# Patient Record
Sex: MALE | Race: WHITE | NOT HISPANIC OR LATINO
[De-identification: names, ages, dates, MRNs, and addresses within clinical notes are randomized per-mention and may not be internally consistent; named-entity substitution may affect disease eponyms.]

---

## 2018-04-12 PROBLEM — Z00.00 ENCOUNTER FOR PREVENTIVE HEALTH EXAMINATION: Status: ACTIVE | Noted: 2018-04-12

## 2018-04-16 ENCOUNTER — APPOINTMENT (OUTPATIENT)
Dept: ORTHOPEDIC SURGERY | Facility: CLINIC | Age: 33
End: 2018-04-16
Payer: MEDICAID

## 2018-04-16 VITALS — RESPIRATION RATE: 16 BRPM | WEIGHT: 180 LBS | BODY MASS INDEX: 23.86 KG/M2 | HEIGHT: 73 IN

## 2018-04-16 DIAGNOSIS — M21.42 FLAT FOOT [PES PLANUS] (ACQUIRED), RIGHT FOOT: ICD-10-CM

## 2018-04-16 DIAGNOSIS — F17.200 NICOTINE DEPENDENCE, UNSPECIFIED, UNCOMPLICATED: ICD-10-CM

## 2018-04-16 DIAGNOSIS — M21.41 FLAT FOOT [PES PLANUS] (ACQUIRED), RIGHT FOOT: ICD-10-CM

## 2018-04-16 DIAGNOSIS — Z87.442 PERSONAL HISTORY OF URINARY CALCULI: ICD-10-CM

## 2018-04-16 DIAGNOSIS — M24.573 CONTRACTURE, UNSPECIFIED ANKLE: ICD-10-CM

## 2018-04-16 PROCEDURE — 99203 OFFICE O/P NEW LOW 30 MIN: CPT

## 2018-04-16 RX ORDER — DEXTROAMPHETAMINE SACCHARATE, AMPHETAMINE ASPARTATE, DEXTROAMPHETAMINE SULFATE, AND AMPHETAMINE SULFATE 3.75; 3.75; 3.75; 3.75 MG/1; MG/1; MG/1; MG/1
TABLET ORAL
Refills: 0 | Status: ACTIVE | COMMUNITY

## 2018-04-16 RX ORDER — ALPRAZOLAM 2 MG/1
TABLET ORAL
Refills: 0 | Status: ACTIVE | COMMUNITY

## 2018-04-23 PROBLEM — M21.41 PES PLANUS OF BOTH FEET: Status: ACTIVE | Noted: 2018-04-12

## 2018-04-23 PROBLEM — F17.200 CURRENT EVERY DAY SMOKER: Status: ACTIVE | Noted: 2018-04-16

## 2018-04-23 PROBLEM — Z87.442 HISTORY OF KIDNEY STONES: Status: RESOLVED | Noted: 2018-04-16 | Resolved: 2018-04-23

## 2018-08-04 ENCOUNTER — EMERGENCY (EMERGENCY)
Facility: HOSPITAL | Age: 33
LOS: 1 days | Discharge: HOME | End: 2018-08-04
Admitting: EMERGENCY MEDICINE
Payer: COMMERCIAL

## 2018-08-04 VITALS
HEART RATE: 98 BPM | TEMPERATURE: 97 F | WEIGHT: 175.05 LBS | SYSTOLIC BLOOD PRESSURE: 130 MMHG | DIASTOLIC BLOOD PRESSURE: 75 MMHG | OXYGEN SATURATION: 100 % | RESPIRATION RATE: 18 BRPM

## 2018-08-04 LAB
ALBUMIN SERPL ELPH-MCNC: 4.4 G/DL — SIGNIFICANT CHANGE UP (ref 3.4–5)
ALP SERPL-CCNC: 86 U/L — SIGNIFICANT CHANGE UP (ref 40–120)
ALT FLD-CCNC: 23 U/L — SIGNIFICANT CHANGE UP (ref 12–42)
ANION GAP SERPL CALC-SCNC: 10 MMOL/L — SIGNIFICANT CHANGE UP (ref 9–16)
AST SERPL-CCNC: 17 U/L — SIGNIFICANT CHANGE UP (ref 15–37)
BASOPHILS NFR BLD AUTO: 0.3 % — SIGNIFICANT CHANGE UP (ref 0–2)
BILIRUB SERPL-MCNC: 0.6 MG/DL — SIGNIFICANT CHANGE UP (ref 0.2–1.2)
BUN SERPL-MCNC: 16 MG/DL — SIGNIFICANT CHANGE UP (ref 7–23)
CALCIUM SERPL-MCNC: 9.1 MG/DL — SIGNIFICANT CHANGE UP (ref 8.5–10.5)
CHLORIDE SERPL-SCNC: 108 MMOL/L — SIGNIFICANT CHANGE UP (ref 96–108)
CO2 SERPL-SCNC: 25 MMOL/L — SIGNIFICANT CHANGE UP (ref 22–31)
CREAT SERPL-MCNC: 0.83 MG/DL — SIGNIFICANT CHANGE UP (ref 0.5–1.3)
EOSINOPHIL NFR BLD AUTO: 0.1 % — SIGNIFICANT CHANGE UP (ref 0–6)
GLUCOSE SERPL-MCNC: 92 MG/DL — SIGNIFICANT CHANGE UP (ref 70–99)
HCT VFR BLD CALC: 46.1 % — SIGNIFICANT CHANGE UP (ref 39–50)
HGB BLD-MCNC: 16 G/DL — SIGNIFICANT CHANGE UP (ref 13–17)
IMM GRANULOCYTES NFR BLD AUTO: 0.3 % — SIGNIFICANT CHANGE UP (ref 0–1.5)
LIDOCAIN IGE QN: 164 U/L — SIGNIFICANT CHANGE UP (ref 73–393)
LYMPHOCYTES # BLD AUTO: 2.5 % — LOW (ref 13–44)
MAGNESIUM SERPL-MCNC: 2.1 MG/DL — SIGNIFICANT CHANGE UP (ref 1.6–2.6)
MCHC RBC-ENTMCNC: 32.7 PG — SIGNIFICANT CHANGE UP (ref 27–34)
MCHC RBC-ENTMCNC: 34.7 G/DL — SIGNIFICANT CHANGE UP (ref 32–36)
MCV RBC AUTO: 94.3 FL — SIGNIFICANT CHANGE UP (ref 80–100)
MONOCYTES NFR BLD AUTO: 5.7 % — SIGNIFICANT CHANGE UP (ref 2–14)
NEUTROPHILS NFR BLD AUTO: 91.1 % — HIGH (ref 43–77)
PLATELET # BLD AUTO: 267 K/UL — SIGNIFICANT CHANGE UP (ref 150–400)
POTASSIUM SERPL-MCNC: 4.1 MMOL/L — SIGNIFICANT CHANGE UP (ref 3.5–5.3)
POTASSIUM SERPL-SCNC: 4.1 MMOL/L — SIGNIFICANT CHANGE UP (ref 3.5–5.3)
PROT SERPL-MCNC: 7.8 G/DL — SIGNIFICANT CHANGE UP (ref 6.4–8.2)
RBC # BLD: 4.89 M/UL — SIGNIFICANT CHANGE UP (ref 4.2–5.8)
RBC # FLD: 12.5 % — SIGNIFICANT CHANGE UP (ref 10.3–16.9)
SODIUM SERPL-SCNC: 143 MMOL/L — SIGNIFICANT CHANGE UP (ref 132–145)
WBC # BLD: 15.7 K/UL — HIGH (ref 3.8–10.5)
WBC # FLD AUTO: 15.7 K/UL — HIGH (ref 3.8–10.5)

## 2018-08-04 PROCEDURE — 99284 EMERGENCY DEPT VISIT MOD MDM: CPT

## 2018-08-04 PROCEDURE — 76705 ECHO EXAM OF ABDOMEN: CPT | Mod: 26

## 2018-08-04 RX ORDER — ACETAMINOPHEN 500 MG
975 TABLET ORAL ONCE
Qty: 0 | Refills: 0 | Status: COMPLETED | OUTPATIENT
Start: 2018-08-04 | End: 2018-08-04

## 2018-08-04 RX ORDER — SODIUM CHLORIDE 9 MG/ML
2000 INJECTION INTRAMUSCULAR; INTRAVENOUS; SUBCUTANEOUS ONCE
Qty: 0 | Refills: 0 | Status: COMPLETED | OUTPATIENT
Start: 2018-08-04 | End: 2018-08-04

## 2018-08-04 RX ORDER — FAMOTIDINE 10 MG/ML
20 INJECTION INTRAVENOUS ONCE
Qty: 0 | Refills: 0 | Status: COMPLETED | OUTPATIENT
Start: 2018-08-04 | End: 2018-08-04

## 2018-08-04 RX ORDER — ONDANSETRON 8 MG/1
8 TABLET, FILM COATED ORAL ONCE
Qty: 0 | Refills: 0 | Status: COMPLETED | OUTPATIENT
Start: 2018-08-04 | End: 2018-08-04

## 2018-08-04 RX ADMIN — SODIUM CHLORIDE 2000 MILLILITER(S): 9 INJECTION INTRAMUSCULAR; INTRAVENOUS; SUBCUTANEOUS at 13:56

## 2018-08-04 RX ADMIN — FAMOTIDINE 20 MILLIGRAM(S): 10 INJECTION INTRAVENOUS at 13:56

## 2018-08-04 RX ADMIN — SODIUM CHLORIDE 2000 MILLILITER(S): 9 INJECTION INTRAMUSCULAR; INTRAVENOUS; SUBCUTANEOUS at 15:05

## 2018-08-04 RX ADMIN — ONDANSETRON 8 MILLIGRAM(S): 8 TABLET, FILM COATED ORAL at 13:56

## 2018-08-04 RX ADMIN — Medication 975 MILLIGRAM(S): at 14:48

## 2018-08-04 NOTE — ED ADULT NURSE NOTE - NSIMPLEMENTINTERV_GEN_ALL_ED
Implemented All Universal Safety Interventions:  [place] to call system. Call bell, personal items and telephone within reach. Instruct patient to call for assistance. Room bathroom lighting operational. Non-slip footwear when patient is off stretcher. Physically safe environment: no spills, clutter or unnecessary equipment. Stretcher in lowest position, wheels locked, appropriate side rails in place.

## 2018-08-04 NOTE — ED ADULT NURSE NOTE - CHPI ED NUR SYMPTOMS NEG
no dysuria/no fever/no chills/no diarrhea/no burning urination/no blood in stool/no hematuria/no abdominal distension

## 2018-08-04 NOTE — ED PROVIDER NOTE - OBJECTIVE STATEMENT
PMHx renal calculi s/p lithotripsy, stent one year ago presents with nausea, 3 episodes of vomiting and epigastric discomfort x 3 hours. admits to having 4-5 drinks last night. states that he woke up yesturday morning with same symptoms after having two glasses of wine. usually had 2-5 drinks on the weekend. denies history of similar sx. admits to reflux. last colonoscopy was one year ago.

## 2018-08-04 NOTE — ED PROVIDER NOTE - MEDICAL DECISION MAKING DETAILS
patient with nausea, vomiting, epigastric pain. will give zofran, IV hydration, check labs and do US

## 2018-08-04 NOTE — ED PROVIDER NOTE - PROGRESS NOTE DETAILS
patient feeling better after pepsid, zofran, IV hydration. all findings discussed. advised follow up with GI

## 2018-08-08 DIAGNOSIS — R10.13 EPIGASTRIC PAIN: ICD-10-CM

## 2018-08-08 DIAGNOSIS — K29.70 GASTRITIS, UNSPECIFIED, WITHOUT BLEEDING: ICD-10-CM

## 2018-08-08 DIAGNOSIS — Z79.2 LONG TERM (CURRENT) USE OF ANTIBIOTICS: ICD-10-CM

## 2018-08-08 DIAGNOSIS — Z79.899 OTHER LONG TERM (CURRENT) DRUG THERAPY: ICD-10-CM

## 2018-08-08 DIAGNOSIS — Z79.891 LONG TERM (CURRENT) USE OF OPIATE ANALGESIC: ICD-10-CM

## 2018-12-09 ENCOUNTER — FORM ENCOUNTER (OUTPATIENT)
Age: 33
End: 2018-12-09

## 2018-12-10 ENCOUNTER — APPOINTMENT (OUTPATIENT)
Dept: RADIOLOGY | Facility: CLINIC | Age: 33
End: 2018-12-10
Payer: COMMERCIAL

## 2018-12-10 ENCOUNTER — OUTPATIENT (OUTPATIENT)
Dept: OUTPATIENT SERVICES | Facility: HOSPITAL | Age: 33
LOS: 1 days | End: 2018-12-10

## 2018-12-10 ENCOUNTER — APPOINTMENT (OUTPATIENT)
Dept: ORTHOPEDIC SURGERY | Facility: CLINIC | Age: 33
End: 2018-12-10
Payer: COMMERCIAL

## 2018-12-10 VITALS — BODY MASS INDEX: 24.78 KG/M2 | WEIGHT: 187 LBS | HEIGHT: 73 IN

## 2018-12-10 DIAGNOSIS — Z87.19 PERSONAL HISTORY OF OTHER DISEASES OF THE DIGESTIVE SYSTEM: ICD-10-CM

## 2018-12-10 DIAGNOSIS — M75.51 BURSITIS OF RIGHT SHOULDER: ICD-10-CM

## 2018-12-10 DIAGNOSIS — M75.52 BURSITIS OF LEFT SHOULDER: ICD-10-CM

## 2018-12-10 PROCEDURE — 73030 X-RAY EXAM OF SHOULDER: CPT | Mod: 26,50

## 2018-12-10 PROCEDURE — 99214 OFFICE O/P EST MOD 30 MIN: CPT

## 2018-12-10 RX ORDER — OMEPRAZOLE 20 MG/1
20 TABLET, DELAYED RELEASE ORAL
Refills: 0 | Status: ACTIVE | COMMUNITY

## 2018-12-10 RX ORDER — EMTRICITABINE AND TENOFOVIR DISOPROXIL FUMARATE 167; 250 MG/1; MG/1
TABLET, FILM COATED ORAL
Refills: 0 | Status: ACTIVE | COMMUNITY

## 2020-01-08 ENCOUNTER — EMERGENCY (EMERGENCY)
Facility: HOSPITAL | Age: 35
LOS: 1 days | Discharge: HOME | End: 2020-01-08
Attending: EMERGENCY MEDICINE | Admitting: EMERGENCY MEDICINE
Payer: COMMERCIAL

## 2020-01-08 VITALS
DIASTOLIC BLOOD PRESSURE: 75 MMHG | OXYGEN SATURATION: 97 % | TEMPERATURE: 99 F | WEIGHT: 175.93 LBS | RESPIRATION RATE: 19 BRPM | SYSTOLIC BLOOD PRESSURE: 133 MMHG | HEIGHT: 73 IN | HEART RATE: 110 BPM

## 2020-01-08 VITALS
DIASTOLIC BLOOD PRESSURE: 67 MMHG | RESPIRATION RATE: 16 BRPM | TEMPERATURE: 99 F | OXYGEN SATURATION: 97 % | HEART RATE: 77 BPM | SYSTOLIC BLOOD PRESSURE: 118 MMHG

## 2020-01-08 LAB
ALBUMIN SERPL ELPH-MCNC: 3.5 G/DL — SIGNIFICANT CHANGE UP (ref 3.4–5)
ALP SERPL-CCNC: 89 U/L — SIGNIFICANT CHANGE UP (ref 40–120)
ALT FLD-CCNC: 47 U/L — HIGH (ref 12–42)
ANION GAP SERPL CALC-SCNC: 8 MMOL/L — LOW (ref 9–16)
AST SERPL-CCNC: 90 U/L — HIGH (ref 15–37)
BILIRUB SERPL-MCNC: 0.2 MG/DL — SIGNIFICANT CHANGE UP (ref 0.2–1.2)
BUN SERPL-MCNC: 7 MG/DL — SIGNIFICANT CHANGE UP (ref 7–23)
CALCIUM SERPL-MCNC: 8.6 MG/DL — SIGNIFICANT CHANGE UP (ref 8.5–10.5)
CHLORIDE SERPL-SCNC: 102 MMOL/L — SIGNIFICANT CHANGE UP (ref 96–108)
CO2 SERPL-SCNC: 26 MMOL/L — SIGNIFICANT CHANGE UP (ref 22–31)
CREAT SERPL-MCNC: 0.99 MG/DL — SIGNIFICANT CHANGE UP (ref 0.5–1.3)
GLUCOSE SERPL-MCNC: 133 MG/DL — HIGH (ref 70–99)
HCT VFR BLD CALC: 40.3 % — SIGNIFICANT CHANGE UP (ref 39–50)
HGB BLD-MCNC: 14.1 G/DL — SIGNIFICANT CHANGE UP (ref 13–17)
LACTATE SERPL-SCNC: 2.4 MMOL/L — HIGH (ref 0.4–2)
MCHC RBC-ENTMCNC: 33.1 PG — SIGNIFICANT CHANGE UP (ref 27–34)
MCHC RBC-ENTMCNC: 35 GM/DL — SIGNIFICANT CHANGE UP (ref 32–36)
MCV RBC AUTO: 94.6 FL — SIGNIFICANT CHANGE UP (ref 80–100)
NRBC # BLD: 0 /100 WBCS — SIGNIFICANT CHANGE UP (ref 0–0)
PLATELET # BLD AUTO: 187 K/UL — SIGNIFICANT CHANGE UP (ref 150–400)
POTASSIUM SERPL-MCNC: 4 MMOL/L — SIGNIFICANT CHANGE UP (ref 3.5–5.3)
POTASSIUM SERPL-SCNC: 4 MMOL/L — SIGNIFICANT CHANGE UP (ref 3.5–5.3)
PROT SERPL-MCNC: 6.8 G/DL — SIGNIFICANT CHANGE UP (ref 6.4–8.2)
RBC # BLD: 4.26 M/UL — SIGNIFICANT CHANGE UP (ref 4.2–5.8)
RBC # FLD: 13 % — SIGNIFICANT CHANGE UP (ref 10.3–14.5)
SODIUM SERPL-SCNC: 136 MMOL/L — SIGNIFICANT CHANGE UP (ref 132–145)
WBC # BLD: 5.19 K/UL — SIGNIFICANT CHANGE UP (ref 3.8–10.5)
WBC # FLD AUTO: 5.19 K/UL — SIGNIFICANT CHANGE UP (ref 3.8–10.5)

## 2020-01-08 PROCEDURE — 99284 EMERGENCY DEPT VISIT MOD MDM: CPT

## 2020-01-08 RX ORDER — DEXTROAMPHETAMINE SACCHARATE, AMPHETAMINE ASPARTATE, DEXTROAMPHETAMINE SULFATE AND AMPHETAMINE SULFATE 1.875; 1.875; 1.875; 1.875 MG/1; MG/1; MG/1; MG/1
0 TABLET ORAL
Qty: 0 | Refills: 0 | DISCHARGE

## 2020-01-08 RX ORDER — EMTRICITABINE AND TENOFOVIR DISOPROXIL FUMARATE 200; 300 MG/1; MG/1
1 TABLET, FILM COATED ORAL
Qty: 0 | Refills: 0 | DISCHARGE

## 2020-01-08 RX ORDER — ACETAMINOPHEN 500 MG
975 TABLET ORAL ONCE
Refills: 0 | Status: COMPLETED | OUTPATIENT
Start: 2020-01-08 | End: 2020-01-08

## 2020-01-08 RX ORDER — IBUPROFEN 200 MG
1 TABLET ORAL
Qty: 15 | Refills: 0
Start: 2020-01-08

## 2020-01-08 RX ORDER — KETOROLAC TROMETHAMINE 30 MG/ML
30 SYRINGE (ML) INJECTION ONCE
Refills: 0 | Status: DISCONTINUED | OUTPATIENT
Start: 2020-01-08 | End: 2020-01-08

## 2020-01-08 RX ORDER — ALPRAZOLAM 0.25 MG
0 TABLET ORAL
Qty: 0 | Refills: 0 | DISCHARGE

## 2020-01-08 RX ORDER — SODIUM CHLORIDE 9 MG/ML
2500 INJECTION INTRAMUSCULAR; INTRAVENOUS; SUBCUTANEOUS ONCE
Refills: 0 | Status: COMPLETED | OUTPATIENT
Start: 2020-01-08 | End: 2020-01-08

## 2020-01-08 RX ADMIN — Medication 75 MILLIGRAM(S): at 17:59

## 2020-01-08 RX ADMIN — SODIUM CHLORIDE 2500 MILLILITER(S): 9 INJECTION INTRAMUSCULAR; INTRAVENOUS; SUBCUTANEOUS at 16:39

## 2020-01-08 RX ADMIN — Medication 975 MILLIGRAM(S): at 16:38

## 2020-01-08 RX ADMIN — Medication 30 MILLIGRAM(S): at 16:39

## 2020-01-08 NOTE — ED PROVIDER NOTE - NSFOLLOWUPINSTRUCTIONS_ED_ALL_ED_FT
Please see your primary care doctor in the next 2-3 days for a repeat evaluation.  Please take all of today's paperwork with you.  If you don't have a doctor or would like help finding a new one, please contact our call center at [phone].    Please return immediately to the Emergency Department if you have pain, fever, trouble breathing, a rash, or if you have any other problems or concerns at all.   You can reach us at [phone], 24 hours a day, 7 days a week.     Viral Respiratory Infection    A viral respiratory infection is an illness that affects parts of the body used for breathing, like the lungs, nose, and throat. It is caused by a germ called a virus. Symptoms can include runny nose, coughing, sneezing, fatigue, body aches, sore throat, fever, or headache. Over the counter medicine can be used to manage the symptoms but the infection typically goes away on its own in 5 to 10 days.     SEEK IMMEDIATE MEDICAL CARE IF YOU HAVE ANY OF THE FOLLOWING SYMPTOMS: shortness of breath, chest pain, fever over 10 days, or lightheadedness/dizziness.     You have INFLUENZA B and RSV, two viruses that cause fever and upper respiratory infection.  Stay well hydrated, wear mask when in public, and take medications as prescribed.  Return immediately if you have problems breathing, vomiting, rash, or if you have any other problems or concerns at all.

## 2020-01-08 NOTE — ED PROVIDER NOTE - CLINICAL SUMMARY MEDICAL DECISION MAKING FREE TEXT BOX
Young nontoxic patient arrived from airport with 1 day of fever, chills, malaise, and dry cough.  Symptoms c/w viral syndrome.  No influenza vaccine this year.  Workup with + RSV and + influenza.  Better with symptom control.  Will d/c home with continued Tamiflu and ibuprofen.

## 2020-01-08 NOTE — ED ADULT NURSE NOTE - OBJECTIVE STATEMENT
Patient to ED - on flight from [place] developed sudden onset of fever, chills, malaise, mild sore throat, dry cough, and chest congestion.  Patient states he was "miserable" on the flight home and then came directly here from the airport.  Patient denies travel outside [place] and doesn't have any known sick exposures.  No flu shot this year.  No neck pain/stiffness, no rash, no dyspnea, no back pain, no neck pain, no abdominal pain/tenderness, no n/v/d/c, leg pain/swelling or other complaints. Patient is very well appearing and in no distress.

## 2020-01-08 NOTE — ED PROVIDER NOTE - PHYSICAL EXAMINATION
Nontoxic well appearing, conversant, no distress  Neck supple  Throat with large tonsils but not red, no exudate, no uvular deviation.  No problems maintaining secreations  No LAD  No rash.

## 2020-01-08 NOTE — ED PROVIDER NOTE - PROGRESS NOTE DETAILS
Marked improvement after medication and fluids.  +RSV and influenza B.  Results discussed.  Patient is feeling much better, is able to tolerate po without difficulty, and is without evidence of toxicity.  Patient advised of importance of close followup and precautions for immediate return.  Patient advised to wear mask at all times when out of his house.

## 2020-01-08 NOTE — ED PROVIDER NOTE - PATIENT PORTAL LINK FT
You can access the FollowMyHealth Patient Portal offered by [facility] by registering at the following website: [email or web address]. By joining [employer]’s FollowMyHealth portal, you will also be able to view your health information using other applications (apps) compatible with our system.

## 2020-01-08 NOTE — ED ADULT TRIAGE NOTE - CHIEF COMPLAINT QUOTE
Pt presents to ED with c/o flu-like symptoms, returned from [place] today, reports cough with myalgias and fever Pt presents to ED with c/o flu-like symptoms, returned from [place] today, reports cough with myalgias and fever. Mask applied

## 2020-01-08 NOTE — ED PROVIDER NOTE - OBJECTIVE STATEMENT
33 y/o otherwise healthy patient states that he has spent the last few weeks in [place] and has been feeling fine until this morning, which is the day he was scheduled to fly home, when he developed sudden onset of fever, chills, malaise, mild sore throat, dry cough, and chest congestion.  Patient states he was "miserable" on the flight home and then came directly here from the airport.  Patient denies travel outside [place] and doesn't have any known sick exposures.  No flu shot this year.  No neck pain/stiffness, no rash, no dyspnea, no back pain, no neck pain, no abdominal pain/tenderness, no n/v/d/c, leg pain/swelling or other complaints. Patient is very well appearing and in no distress.

## 2020-01-08 NOTE — ED ADULT NURSE NOTE - CHIEF COMPLAINT QUOTE
Pt presents to ED with c/o flu-like symptoms, returned from [place] today, reports cough with myalgias and fever. Mask applied

## 2020-01-12 DIAGNOSIS — R50.9 FEVER, UNSPECIFIED: ICD-10-CM

## 2020-01-12 DIAGNOSIS — J10.1 INFLUENZA DUE TO OTHER IDENTIFIED INFLUENZA VIRUS WITH OTHER RESPIRATORY MANIFESTATIONS: ICD-10-CM

## 2020-01-12 DIAGNOSIS — F17.200 NICOTINE DEPENDENCE, UNSPECIFIED, UNCOMPLICATED: ICD-10-CM

## 2020-06-23 ENCOUNTER — EMERGENCY (EMERGENCY)
Facility: HOSPITAL | Age: 35
LOS: 1 days | Discharge: HOME | End: 2020-06-23
Admitting: EMERGENCY MEDICINE
Payer: COMMERCIAL

## 2020-06-23 VITALS
WEIGHT: 149.91 LBS | RESPIRATION RATE: 18 BRPM | DIASTOLIC BLOOD PRESSURE: 89 MMHG | HEART RATE: 62 BPM | TEMPERATURE: 98 F | OXYGEN SATURATION: 99 % | SYSTOLIC BLOOD PRESSURE: 135 MMHG | HEIGHT: 68 IN

## 2020-06-23 PROCEDURE — 99283 EMERGENCY DEPT VISIT LOW MDM: CPT

## 2020-06-23 RX ORDER — BROMPHENIRAMINE MALEATE, PSEUDOEPHEDRINE HYDROCHLORIDE, AND DEXTROMETHORPHAN HYDROBROMIDE 2; 10; 30 MG/5ML; MG/5ML; MG/5ML
5 SOLUTION ORAL
Qty: 80 | Refills: 0
Start: 2020-06-23 | End: 2020-06-26

## 2020-06-23 RX ORDER — ACETAMINOPHEN 500 MG
650 TABLET ORAL ONCE
Refills: 0 | Status: COMPLETED | OUTPATIENT
Start: 2020-06-23 | End: 2020-06-23

## 2020-06-23 RX ORDER — PSEUDOEPHEDRINE HCL 30 MG
30 TABLET ORAL ONCE
Refills: 0 | Status: COMPLETED | OUTPATIENT
Start: 2020-06-23 | End: 2020-06-23

## 2020-06-23 RX ADMIN — Medication 30 MILLIGRAM(S): at 16:16

## 2020-06-23 RX ADMIN — Medication 650 MILLIGRAM(S): at 16:16

## 2020-06-23 NOTE — ED PROVIDER NOTE - OBJECTIVE STATEMENT
33 y/o male denies hx now here with coryzal symptoms -cough,congesiton, intermittent fevers since he was diagnosed COVID positive on Saturday. Patient states since then he has been experiencing sinus congestion and pressure. States his sense of smell has change. He was sent in from [facility] hotline into ED for eval. Appears well NAD stable. Denies chest pain,nausea,vomiting,diarrhea,chills,sob,trauma,loc. Patient states his main concern is the sinus congestion and pressure

## 2020-06-23 NOTE — ED PROVIDER NOTE - PROGRESS NOTE DETAILS
Patient given decongestant in ED. Patient Will follow up with PCP given COVID precautions. Patient appears well vibrant

## 2020-06-23 NOTE — ED PROVIDER NOTE - NSFOLLOWUPINSTRUCTIONS_ED_ALL_ED_FT
Follow up with your primary care doctor or clinics listed below if you do not have a doctor  Return immediately for any new or worsening symptoms or any new concerns    Sinusitis    AMBULATORY CARE:    Sinusitis is inflammation or infection of your sinuses. It is most often caused by a virus. Acute sinusitis may last up to 12 weeks. Chronic sinusitis lasts longer than 12 weeks. Recurrent sinusitis means you have 4 or more times in 1 year. Sinuses         Common symptoms include the following:     Fever      Pain, pressure, redness, or swelling around the forehead, cheeks, or eyes      Thick yellow or green discharge from your nose      Tenderness when you touch your face over your sinuses      Dry cough that happens mostly at night or when you lie down      Headache and face pain that is worse when you lean forward      Tooth pain, or pain when you chew    Seek care immediately if:     Your eye and eyelid are red, swollen, and painful.       You cannot open your eye.       You have vision changes, such as double vision.      Your eyeball bulges out or you cannot move your eye.       You are more sleepy than normal, or you notice changes in your ability to think, move, or talk.      You have a stiff neck, a fever, or a bad headache.       You have swelling of your forehead or scalp.    Contact your healthcare provider if:     Your symptoms do not improve after 3 days.      Your symptoms do not go away after 10 days.      You have nausea and are vomiting.      Your nose is bleeding.      You have questions or concerns about your condition or care.    Treatment for sinusitis: Your symptoms may go away on their own. Your healthcare provider may recommend watchful waiting for up to 10 days before starting antibiotics. You may need any of the following:     Acetaminophen decreases pain and fever. It is available without a doctor's order. Ask how much to take and how often to take it. Follow directions. Read the labels of all other medicines you are using to see if they also contain acetaminophen, or ask your doctor or pharmacist. Acetaminophen can cause liver damage if not taken correctly. Do not use more than 4 grams (4,000 milligrams) total of acetaminophen in one day.       NSAIDs, such as ibuprofen, help decrease swelling, pain, and fever. This medicine is available with or without a doctor's order. NSAIDs can cause stomach bleeding or kidney problems in certain people. If you take blood thinner medicine, always ask your healthcare provider if NSAIDs are safe for you. Always read the medicine label and follow directions.      Nasal steroid sprays may help decrease inflammation in your nose and sinuses.      Decongestants help reduce swelling and drain mucus in the nose and sinuses. They may help you breathe easier.       Antihistamines help dry mucus in the nose and relieve sneezing.       Antibiotics help treat or prevent a bacterial infection.      Take your medicine as directed. Contact your healthcare provider if you think your medicine is not helping or if you have side effects. Tell him or her if you are allergic to any medicine. Keep a list of the medicines, vitamins, and herbs you take. Include the amounts, and when and why you take them. Bring the list or the pill bottles to follow-up visits. Carry your medicine list with you in case of an emergency.    Self-care:     Rinse your sinuses. Use a sinus rinse device to rinse your nasal passages with a saline (salt water) solution or distilled water. Do not use tap water. This will help thin the mucus in your nose and rinse away pollen and dirt. It will also help reduce swelling so you can breathe normally. Ask your healthcare provider how often to do this.       Breathe in steam. Heat a bowl of water until you see steam. Lean over the bowl and make a tent over your head with a large towel. Breathe deeply for about 20 minutes. Be careful not to get too close to the steam or burn yourself. Do this 3 times a day. You can also breathe deeply when you take a hot shower.       Sleep with your head elevated. Place an extra pillow under your head before you go to sleep to help your sinuses drain.       Drink liquids as directed. Ask your healthcare provider how much liquid to drink each day and which liquids are best for you. Liquids will thin the mucus in your nose and help it drain. Avoid drinks that contain alcohol or caffeine.       Do not smoke, and avoid secondhand smoke. Nicotine and other chemicals in cigarettes and cigars can make your symptoms worse. Ask your healthcare provider for information if you currently smoke and need help to quit. E-cigarettes or smokeless tobacco still contain nicotine. Talk to your healthcare provider before you use these products.     Prevent the spread of germs that cause sinusitis: Wash your hands often with soap and water. Wash your hands after you use the bathroom, change a child's diaper, or sneeze. Wash your hands before you prepare or eat food.Handwashing         Follow up with your healthcare provider as directed: You may be referred to an ear, nose, and throat specialist. Write down your questions so you remember to ask them during your visits.        © Copyright [employer] 2020

## 2020-06-23 NOTE — ED ADULT NURSE NOTE - CHPI ED NUR SYMPTOMS NEG
no tingling/no chills/no decreased eating/drinking/no pain/no weakness/no dizziness/no vomiting/no fever/no nausea

## 2020-06-23 NOTE — ED ADULT NURSE NOTE - NSIMPLEMENTINTERV_GEN_ALL_ED
Implemented All Universal Safety Interventions:  [place] to call system. Call bell, personal items and telephone within reach. Instruct patient to call for assistance. Room bathroom lighting operational. Non-slip footwear when patient is off stretcher. Physically safe environment: no spills, clutter or unnecessary equipment. Stretcher in lowest position, wheels locked, appropriate side rails in place.

## 2020-06-23 NOTE — ED PROVIDER NOTE - CLINICAL SUMMARY MEDICAL DECISION MAKING FREE TEXT BOX
35 y/o male covid positive this weekend now here for sinus congestion  exam wnl.   Plan: Decongestants and COVID precautions   Given Return precautions

## 2020-06-23 NOTE — ED PROVIDER NOTE - PATIENT PORTAL LINK FT
You can access the FollowMyHealth Patient Portal offered by [facility] by registering at the following website: [email or web address]. By joining [employer]’s FollowMyHealth portal, you will also be able to view your health information using other applications (apps) compatible with our system.

## 2020-06-23 NOTE — ED ADULT TRIAGE NOTE - CHIEF COMPLAINT QUOTE
Patient to ED s/p +COVID-19 swab.   States nostrils hurt and was told by hotline to come to ED.  No other symptoms

## 2020-06-27 DIAGNOSIS — J34.89 OTHER SPECIFIED DISORDERS OF NOSE AND NASAL SINUSES: ICD-10-CM

## 2020-06-27 DIAGNOSIS — U07.1 COVID-19: ICD-10-CM

## 2020-06-27 DIAGNOSIS — R05 COUGH: ICD-10-CM

## 2020-08-08 NOTE — ED PROVIDER NOTE - HEME/LYMPH NEGATIVE STATEMENT, MLM
[facility] Medicine Services  INPATIENT PROGRESS NOTE    Patient Name: Mohan Villatoro  Date of Admission: 8/6/2020  Today's Date: 08/08/20  Length of Stay: 2  Primary Care Physician: Provider, No Known    Subjective   Chief Complaint: Intubated  HPI   Currently intubated and sedated  No events overnight  Pulm planning to start weaning trial        Review of Systems   Unable to perform ROS: Intubated        All pertinent negatives and positives are as above. All other systems have been reviewed and are negative unless otherwise stated.     Objective    Temp:  [93.5 °F (34.2 °C)-98.3 °F (36.8 °C)] 97 °F (36.1 °C)  Heart Rate:  [] 81  Resp:  [18-27] 26  BP: ()/(46-99) 135/58  FiO2 (%):  [30 %] 30 %  Physical Exam   Constitutional: He appears well-developed and well-nourished. He is sedated and intubated.   HENT:   Head: Normocephalic and atraumatic.   Right Ear: External ear normal.   Left Ear: External ear normal.   Nose: Nose normal.   Mouth/Throat: Oropharynx is clear and moist.   Eyes: Pupils are equal, round, and reactive to light. Conjunctivae and EOM are normal. Right eye exhibits no discharge. Left eye exhibits no discharge. No scleral icterus.   Neck: Normal range of motion. Neck supple. No tracheal deviation present. No thyromegaly present.   Cardiovascular: Normal rate, regular rhythm, normal heart sounds and intact distal pulses. Exam reveals no gallop and no friction rub.   No murmur heard.  Pulmonary/Chest: Effort normal. No stridor. He is intubated. No respiratory distress. He has decreased breath sounds. He has no wheezes. He has no rales. He exhibits no tenderness.   Abdominal: Soft. Bowel sounds are normal. He exhibits no distension and no mass. There is no tenderness. There is no rebound and no guarding. No hernia.   Musculoskeletal: Normal range of motion. He exhibits no edema or deformity.   Lymphadenopathy:     He has no cervical adenopathy.      Neurological: He has normal reflexes. He displays normal reflexes. No cranial nerve deficit. He exhibits normal muscle tone. Coordination normal.   Skin: Skin is warm and dry. No rash noted. No erythema. No pallor.   Vitals reviewed.          Results Review:  I have reviewed the labs, radiology results, and diagnostic studies.    Laboratory Data:   Results from last 7 days   Lab Units 08/08/20  0258 08/07/20  0333 08/06/20  0745   WBC 10*3/mm3 14.11* 17.42* 13.59*   HEMOGLOBIN g/dL 7.1* 7.9* 7.8*   HEMATOCRIT % 19.3* 22.9* 21.4*   PLATELETS 10*3/mm3 268 346 313        Results from last 7 days   Lab Units 08/08/20 0258 08/07/20  0744 08/07/20  0333   SODIUM mmol/L 122* 119* 117*   POTASSIUM mmol/L 4.8 5.4* 4.9   CHLORIDE mmol/L 85* 85* 84*   CO2 mmol/L 19.0* 19.0* 17.0*   BUN mg/dL 50* 49* 44*   CREATININE mg/dL 2.58* 2.59* 2.48*   CALCIUM mg/dL 7.8* 7.7* 8.2*   BILIRUBIN mg/dL 0.2  --  0.2   ALK PHOS U/L 49  --  61   ALT (SGPT) U/L 47*  --  50*   AST (SGOT) U/L 104*  --  137*   GLUCOSE mg/dL 117* 125* 116*       Culture Data:   No results found for: BLOODCX, URINECX, WOUNDCX, MRSACX, RESPCX, STOOLCX    Radiology Data:   Imaging Results (Last 24 Hours)     ** No results found for the last 24 hours. **          I have reviewed the patient's current medications.     Assessment/Plan     Active Hospital Problems    Diagnosis   • Hyponatremia   • Hyperkalemia   • Nausea and vomiting   • Acute kidney injury (CMS/HCC)   • Hypoxia   • COPD (chronic obstructive pulmonary disease) (CMS/HCC)   • Tobacco dependence   • Anemia   • Paranoid schizophrenia (CMS/HCC)   • Essential hypertension   • Hyperlipidemia     Labs reviewed:  Sodium improved up to 122, Cr stable 2.59 to 2.58    Imaging reviewed:  CXR, reported noted    CXR independently interpreted by me:  Pulmonary edema, ETT in good position          1.  Acute Hypoxic Respiratory failure  -Intubated  -pulm consulted, following  -repeat CXR in AM  -Repeat ABG in AM    2.   Cardiac Arrest  -Telemetry  -Cardiology consulted    3.  Hyperkalemia  -normalized  -monitor    4.  Hyponatremia  -Nephrology consulted, following  -Hold Psych meds    5.  COPD  -Nebs PRN  -IV steroids    6.  Anemia  -Trend H&H  -transfuse as indicated    7.  HTN  -monitor    8.  HLD  -monitor    9.  Paranoid Schizophrenia   -hold psych meds given hyponatremia    10.  Flomax  -BPH    11.  Acute renal failure  -nephrology consulted, following              Discharge Planning: continue in CCU    Electronically signed by Tripp Bruner MD, 08/08/20, 11:07.     no anemia, no easy bruising, no jaundice, no swollen lymph nodes.

## 2021-10-25 NOTE — ED PROVIDER NOTE - ABDOMINAL EXAM
Advance Care Planning    Ambulatory ACP Specialist Patient Outreach    Date:  10/25/2021  ACP Specialist:  ANGELA Crockett    Outreach call to patient in follow-up to ACP Specialist referral from: Rodríguez Zavala MD    [x] PCP  [] Provider   [] Ambulatory Care Management [] Other for Reason:        [] Early ACP Decision-Making   [x] Advance Directive Assistance   [] Discuss Goals of Care   [] Code Status Discussion   [] Completion of Portable DNR order   [] Complete POST/MOST   [] Other (Specify)    Date Referral Received: 10/6/21    Today's Outreach:  [] First   [x] Second  [] Third                               Third outreach made by []  phone  [] email []   [employer]     Intervention:  [] Spoke with Patient  [x] Left VM requesting return call      Outcome: Left another VM message today. Next Step:   [] ACP scheduled conversation  [x] Outreach again in one week               [] Email / Mail ACP Info Sheets  [] Email / Mail Advance Directive            [] Close Referral. Routing closure to referring provider/staff and to ACP Specialist .      Thank you for this referral.
tender.../soft

## 2021-12-16 NOTE — ED ADULT NURSE NOTE - NSFALLRSKINDICATORS_ED_ALL_ED
Onset: today  Location/description: Mother is calling, states child has had a cough for the past fever days and today is c/o of headache and has developed a fever. Mother states cough is a dry cough, she denies child any difficulty breathing. Mother denies child having any known exposure to COVID  Associated Symptoms: as above  What improves/worsens symptoms: none  Symptom specific medications: gave tylenol  Intake and Output: no vomiting or diarrhea reported  Activity level: sleeping now  Temperature (route and time): feels feverish, no thermometer availabe  Weight:   Wt Readings from Last 1 Encounters:   10/18/21 19.6 kg (43 lb 3.2 oz) (94 %, Z= 1.59)*     * Growth percentiles are based on CDC (Boys, 2-20 Years) data.        Recent visits (last 3-4 weeks) for same reason or recent surgery:  none    PLAN:  See Provider within 24 hours, mother states she will take child to  for evaluation and COVID test..    Caller agrees to follow recommendations.    Reason for Disposition  • [1] COVID-19 infection suspected by caller or triager AND [2] mild symptoms (cough, fever, or others) AND [3] no complications or SOB    Protocols used: CORONAVIRUS (COVID-19) DIAGNOSED OR [id number]      
Regarding: [place]-Picked [place] from  and has a cough, headache, complained his head was hurting and warm, wants  ----- Message from Vicki Alex sent at 12/16/2021  5:12 PM CST -----  Patient Name: Ryan YONY Hawa    Full Name of Provider seen for current symptoms:Yenni Pena    Pregnant (If Yes, how long?):N/A    Symptoms: Picked up from  and has a cough, headache, complained his head was hurting and warm, wants to know if she can schedule an appt    Call Back #:[phone]    Call Center Account # for provider seen for current symptoms:[id number]    Which State are you currently located in? (enter State name in Summary field): [place]    
no

## 2022-08-22 NOTE — ED PROVIDER NOTE - CROS ED HEME ALL NEG
Addended by: ROBERT ALEJO on: 8/22/2022 03:59 PM     Modules accepted: Orders    
Addended by: ROBERT ALEJO on: 8/22/2022 05:28 PM     Modules accepted: Orders    
negative...

## 2023-04-12 ENCOUNTER — EMERGENCY (EMERGENCY)
Facility: HOSPITAL | Age: 38
LOS: 1 days | Discharge: HOME | End: 2023-04-12
Admitting: EMERGENCY MEDICINE
Payer: COMMERCIAL

## 2023-04-12 ENCOUNTER — TRANSCRIPTION ENCOUNTER (OUTPATIENT)
Age: 38
End: 2023-04-12

## 2023-04-12 VITALS
DIASTOLIC BLOOD PRESSURE: 73 MMHG | HEART RATE: 78 BPM | RESPIRATION RATE: 16 BRPM | SYSTOLIC BLOOD PRESSURE: 115 MMHG | OXYGEN SATURATION: 98 % | TEMPERATURE: 98 F
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
## 2023-04-12 DIAGNOSIS — M54.2 CERVICALGIA: ICD-10-CM

## 2023-04-12 DIAGNOSIS — S16.1XXA STRAIN OF MUSCLE, FASCIA AND TENDON AT NECK LEVEL, INITIAL ENCOUNTER: ICD-10-CM
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
## 2023-04-12 PROCEDURE — 99284 EMERGENCY DEPT VISIT MOD MDM: CPT

## 2023-04-12 NOTE — ED ADULT TRIAGE NOTE - CHIEF COMPLAINT QUOTE
Pt walked in c/o R sided neck pain since morning. Notes had a massage today which worsening pain. Pain radiating down R arm.

## 2023-04-13 VITALS
OXYGEN SATURATION: 96 % | SYSTOLIC BLOOD PRESSURE: 101 MMHG | HEART RATE: 65 BPM | RESPIRATION RATE: 16 BRPM | TEMPERATURE: 98 F | DIASTOLIC BLOOD PRESSURE: 65 MMHG

## 2023-04-13 RX ORDER — KETOROLAC TROMETHAMINE 30 MG/ML
30 SYRINGE (ML) INJECTION ONCE
Refills: 0 | Status: DISCONTINUED | OUTPATIENT
Start: 2023-04-13 | End: 2023-04-13

## 2023-04-13 RX ORDER — CYCLOBENZAPRINE HYDROCHLORIDE 10 MG/1
1 TABLET, FILM COATED ORAL
Qty: 21 | Refills: 0
Start: 2023-04-13 | End: 2023-04-19

## 2023-04-13 RX ORDER — IBUPROFEN 200 MG
1 TABLET ORAL
Qty: 56 | Refills: 0
Start: 2023-04-13 | End: 2023-04-26

## 2023-04-13 RX ORDER — DIAZEPAM 5 MG
5 TABLET ORAL ONCE
Refills: 0 | Status: DISCONTINUED | OUTPATIENT
Start: 2023-04-13 | End: 2023-04-13

## 2023-04-13 RX ORDER — LIDOCAINE 4 G/100G
1 CREAM TOPICAL ONCE
Refills: 0 | Status: COMPLETED | OUTPATIENT
Start: 2023-04-13 | End: 2023-04-13

## 2023-04-13 RX ADMIN — Medication 30 MILLIGRAM(S): at 00:51

## 2023-04-13 RX ADMIN — LIDOCAINE 1 PATCH: 4 CREAM TOPICAL at 00:52

## 2023-04-13 RX ADMIN — Medication 5 MILLIGRAM(S): at 00:51

## 2023-04-13 NOTE — ED PROVIDER NOTE - PATIENT PORTAL LINK FT
You can access the FollowMyHealth Patient Portal offered by [facility] by registering at the following website: [email or web address]. By joining [employer]’s FollowMyHealth portal, you will also be able to view your health information using other applications (apps) compatible with our system.

## 2023-04-13 NOTE — ED PROVIDER NOTE - OBJECTIVE STATEMENT
36 yo m well appearing pw R sided neck pain aching mod in severity non radiating worse with neck movement began in the PM after pt woke up felt a deepika in the neck progressively increasing in severity radiating down the R arm worse with neck movement, improves with rest. No neck trauma or manipulation.     I have reviewed available current nursing and previous documentation of past medical, surgical, family, and/or social history.

## 2023-04-13 NOTE — ED ADULT NURSE NOTE - CAS TRG GENERAL AIRWAY, MLM
Detail Level: Detailed Biopsy Photograph Reviewed: Yes Accession # (Optional): [id number] Size Of Lesion In Cm: 0.5 Size Of Lesion After Curettage: 1 Add Intralesional Injection: No Anesthesia Type: 1% lidocaine with epinephrine Anesthesia Volume In Cc: 2.5 Cautery Type: electrodesiccation Number Of Curettages: 2 What Was Performed First?: Curettage Additional Information: (Optional): The wound was cleaned, and a pressure dressing was applied.  The patient received detailed post-op instructions. Patent Consent was obtained from the patient. The risks, benefits and alternatives to therapy were discussed in detail. Specifically, the risks of infection, scarring, bleeding, prolonged wound healing, nerve injury, incomplete removal, allergy to anesthesia and recurrence were addressed. Alternatives to ED&C, such as: surgical removal and XRT were also discussed.  Prior to the procedure, the treatment site was clearly identified and confirmed by the patient. All components of Universal Protocol/PAUSE Rule completed. Post-Care Instructions: I reviewed with the patient in detail post-care instructions. Patient is to keep the area dry for 48 hours, and not to engage in any swimming until the area is healed. Should the patient develop any fevers, chills, bleeding, severe pain patient will contact the office immediately. Bill As A Line Item Or As Units: Line Item

## 2023-04-13 NOTE — ED PROVIDER NOTE - CLINICAL SUMMARY MEDICAL DECISION MAKING FREE TEXT BOX
well appearing pt here with neck strain after waking up with a 'kink' in the R side of the neck pt had no neck manipulation or trauma prior to developing neck pain, neurovascular intact, plan: nsaids, diazepam, lidocaine, dc

## 2023-04-13 NOTE — ED ADULT NURSE NOTE - OBJECTIVE STATEMENT
Pt is a 36 y/o M c/o neck pain. patient reports he felt that he slept on neck in uncomfortably position last night. patient report he went to get a massage pain exacerbated. patient unable to rotate head. Pt reports pain radiates towards upper back and arm.

## 2023-04-13 NOTE — ED PROVIDER NOTE - NS ED ROS FT
Review of Systems    Constitutional: (-) fever  Cardiovascular: (-) chest pain, (-) palpitation   Musculoskeletal: (-) back pain, (-) joint pain  Integumentary: (-) rash, (-) edema  Neurological: (-) headache, (-) altered mental status  Heme/Lymph: (-) easy bruising (-) easy bleeding

## 2023-04-13 NOTE — ED PROVIDER NOTE - PHYSICAL EXAMINATION
Physical Exam    Vital Signs: I have reviewed the initial vital signs.  Constitutional: well-appearing, appears stated age  Neck: No c spine ttp, full neck ROM, no bruit over the cardiology   Cardiovascular: regular rate, regular rhythm, well-perfused extremities, radial pulse +2 and equal b/l m  Respiratory: unlabored respiratory effort, clear to auscultation bilaterally  Gastrointestinal: soft, non-tender abdomen, no pulsatile mass  Musculoskeletal: supple neck, +TTP over the R proximal trapezius insertio, no c spine ttp  Integumentary: warm, dry, no rash  Neurologic: awake, alert, cranial nerves II-XII grossly intact, extremities’ motor and sensory functions grossly intact

## 2024-07-19 ENCOUNTER — INPATIENT (INPATIENT)
Facility: HOSPITAL | Age: 39
LOS: 0 days | Discharge: HOME | DRG: 694 | End: 2024-07-20
Attending: UROLOGY | Admitting: UROLOGY
Payer: COMMERCIAL

## 2024-07-19 VITALS
HEART RATE: 61 BPM | DIASTOLIC BLOOD PRESSURE: 84 MMHG | OXYGEN SATURATION: 100 % | SYSTOLIC BLOOD PRESSURE: 146 MMHG | RESPIRATION RATE: 16 BRPM | TEMPERATURE: 98 F

## 2024-07-19 LAB
ALBUMIN SERPL ELPH-MCNC: 3.7 G/DL — SIGNIFICANT CHANGE UP (ref 3.4–5)
ALP SERPL-CCNC: 96 U/L — SIGNIFICANT CHANGE UP (ref 40–120)
ALT FLD-CCNC: 29 U/L — SIGNIFICANT CHANGE UP (ref 12–42)
ANION GAP SERPL CALC-SCNC: 8 MMOL/L — LOW (ref 9–16)
APPEARANCE UR: ABNORMAL
AST SERPL-CCNC: 51 U/L — HIGH (ref 15–37)
BACTERIA # UR AUTO: ABNORMAL /HPF
BASOPHILS # BLD AUTO: 0.04 K/UL — SIGNIFICANT CHANGE UP (ref 0–0.2)
BASOPHILS NFR BLD AUTO: 0.3 % — SIGNIFICANT CHANGE UP (ref 0–2)
BILIRUB SERPL-MCNC: 0.5 MG/DL — SIGNIFICANT CHANGE UP (ref 0.2–1.2)
BILIRUB UR-MCNC: NEGATIVE — SIGNIFICANT CHANGE UP
BUN SERPL-MCNC: 18 MG/DL — SIGNIFICANT CHANGE UP (ref 7–23)
CALCIUM SERPL-MCNC: 9.2 MG/DL — SIGNIFICANT CHANGE UP (ref 8.5–10.5)
CHLORIDE SERPL-SCNC: 104 MMOL/L — SIGNIFICANT CHANGE UP (ref 96–108)
CO2 SERPL-SCNC: 28 MMOL/L — SIGNIFICANT CHANGE UP (ref 22–31)
COD CRY URNS QL: SIGNIFICANT CHANGE UP
COLOR SPEC: YELLOW — SIGNIFICANT CHANGE UP
COMMENT - URINE: SIGNIFICANT CHANGE UP
CREAT SERPL-MCNC: 1.17 MG/DL — SIGNIFICANT CHANGE UP (ref 0.5–1.3)
DIFF PNL FLD: ABNORMAL
EGFR: 82 ML/MIN/1.73M2 — SIGNIFICANT CHANGE UP
EOSINOPHIL # BLD AUTO: 0.08 K/UL — SIGNIFICANT CHANGE UP (ref 0–0.5)
EOSINOPHIL NFR BLD AUTO: 0.6 % — SIGNIFICANT CHANGE UP (ref 0–6)
EPI CELLS # UR: 1 — SIGNIFICANT CHANGE UP
GLUCOSE SERPL-MCNC: 118 MG/DL — HIGH (ref 70–99)
GLUCOSE UR QL: NEGATIVE MG/DL — SIGNIFICANT CHANGE UP
GRAN CASTS # UR COMP ASSIST: SIGNIFICANT CHANGE UP
HCT VFR BLD CALC: 43.9 % — SIGNIFICANT CHANGE UP (ref 39–50)
HGB BLD-MCNC: 15.2 G/DL — SIGNIFICANT CHANGE UP (ref 13–17)
HYALINE CASTS # UR AUTO: SIGNIFICANT CHANGE UP
IMM GRANULOCYTES NFR BLD AUTO: 0.4 % — SIGNIFICANT CHANGE UP (ref 0–0.9)
KETONES UR-MCNC: 40 MG/DL
LEUKOCYTE ESTERASE UR-ACNC: ABNORMAL
LIDOCAIN IGE QN: 52 U/L — SIGNIFICANT CHANGE UP (ref 16–77)
LYMPHOCYTES # BLD AUTO: 1.13 K/UL — SIGNIFICANT CHANGE UP (ref 1–3.3)
LYMPHOCYTES # BLD AUTO: 8.4 % — LOW (ref 13–44)
MCHC RBC-ENTMCNC: 33.3 PG — SIGNIFICANT CHANGE UP (ref 27–34)
MCHC RBC-ENTMCNC: 34.6 GM/DL — SIGNIFICANT CHANGE UP (ref 32–36)
MCV RBC AUTO: 96.1 FL — SIGNIFICANT CHANGE UP (ref 80–100)
MONOCYTES # BLD AUTO: 0.65 K/UL — SIGNIFICANT CHANGE UP (ref 0–0.9)
MONOCYTES NFR BLD AUTO: 4.8 % — SIGNIFICANT CHANGE UP (ref 2–14)
NEUTROPHILS # BLD AUTO: 11.54 K/UL — HIGH (ref 1.8–7.4)
NEUTROPHILS NFR BLD AUTO: 85.5 % — HIGH (ref 43–77)
NITRITE UR-MCNC: NEGATIVE — SIGNIFICANT CHANGE UP
NRBC # BLD: 0 /100 WBCS — SIGNIFICANT CHANGE UP (ref 0–0)
PH UR: 6.5 — SIGNIFICANT CHANGE UP (ref 5–8)
PLATELET # BLD AUTO: 305 K/UL — SIGNIFICANT CHANGE UP (ref 150–400)
POTASSIUM SERPL-MCNC: 4.1 MMOL/L — SIGNIFICANT CHANGE UP (ref 3.5–5.3)
POTASSIUM SERPL-SCNC: 4.1 MMOL/L — SIGNIFICANT CHANGE UP (ref 3.5–5.3)
PROT SERPL-MCNC: 7.2 G/DL — SIGNIFICANT CHANGE UP (ref 6.4–8.2)
PROT UR-MCNC: 30 MG/DL
RBC # BLD: 4.57 M/UL — SIGNIFICANT CHANGE UP (ref 4.2–5.8)
RBC # FLD: 12 % — SIGNIFICANT CHANGE UP (ref 10.3–14.5)
RBC CASTS # UR COMP ASSIST: >100 /HPF — HIGH (ref 0–4)
SODIUM SERPL-SCNC: 140 MMOL/L — SIGNIFICANT CHANGE UP (ref 132–145)
SP GR SPEC: 1.03 — SIGNIFICANT CHANGE UP (ref 1–1.03)
TRI-PHOS CRY UR QL COMP ASSIST: SIGNIFICANT CHANGE UP
URATE CRY FLD QL MICRO: SIGNIFICANT CHANGE UP
UROBILINOGEN FLD QL: 1 MG/DL — SIGNIFICANT CHANGE UP (ref 0.2–1)
WBC # BLD: 13.5 K/UL — HIGH (ref 3.8–10.5)
WBC # FLD AUTO: 13.5 K/UL — HIGH (ref 3.8–10.5)
WBC UR QL: 5 /HPF — SIGNIFICANT CHANGE UP (ref 0–5)

## 2024-07-19 PROCEDURE — 74176 CT ABD & PELVIS W/O CONTRAST: CPT | Mod: 26,MC

## 2024-07-19 PROCEDURE — 99285 EMERGENCY DEPT VISIT HI MDM: CPT

## 2024-07-19 RX ORDER — METOCLOPRAMIDE 5 MG/5ML
10 SOLUTION ORAL ONCE
Refills: 0 | Status: COMPLETED | OUTPATIENT
Start: 2024-07-19 | End: 2024-07-20

## 2024-07-19 RX ORDER — SODIUM CHLORIDE 0.9 % (FLUSH) 0.9 %
2000 SYRINGE (ML) INJECTION ONCE
Refills: 0 | Status: COMPLETED | OUTPATIENT
Start: 2024-07-19 | End: 2024-07-19

## 2024-07-19 RX ORDER — ONDANSETRON HYDROCHLORIDE 2 MG/ML
4 INJECTION INTRAMUSCULAR; INTRAVENOUS ONCE
Refills: 0 | Status: COMPLETED | OUTPATIENT
Start: 2024-07-19 | End: 2024-07-19

## 2024-07-19 RX ORDER — KETOROLAC TROMETHAMINE 30 MG/ML
15 INJECTION, SOLUTION INTRAMUSCULAR ONCE
Refills: 0 | Status: DISCONTINUED | OUTPATIENT
Start: 2024-07-19 | End: 2024-07-19

## 2024-07-19 RX ORDER — HYDROMORPHONE HCL 0.2 MG/ML
1 INJECTION, SOLUTION INTRAVENOUS ONCE
Refills: 0 | Status: DISCONTINUED | OUTPATIENT
Start: 2024-07-19 | End: 2024-07-19

## 2024-07-19 RX ORDER — TAMSULOSIN HYDROCHLORIDE 0.4 MG/1
0.4 CAPSULE ORAL ONCE
Refills: 0 | Status: COMPLETED | OUTPATIENT
Start: 2024-07-19 | End: 2024-07-19

## 2024-07-19 RX ORDER — SODIUM CHLORIDE 0.9 % (FLUSH) 0.9 %
1000 SYRINGE (ML) INJECTION ONCE
Refills: 0 | Status: COMPLETED | OUTPATIENT
Start: 2024-07-19 | End: 2024-07-19

## 2024-07-19 RX ORDER — HYDROMORPHONE HCL 0.2 MG/ML
0.5 INJECTION, SOLUTION INTRAVENOUS ONCE
Refills: 0 | Status: DISCONTINUED | OUTPATIENT
Start: 2024-07-19 | End: 2024-07-19

## 2024-07-19 RX ORDER — MORPHINE SULFATE 100 MG/1
4 TABLET, EXTENDED RELEASE ORAL ONCE
Refills: 0 | Status: DISCONTINUED | OUTPATIENT
Start: 2024-07-19 | End: 2024-07-19

## 2024-07-19 RX ORDER — MORPHINE SULFATE 100 MG/1
6 TABLET, EXTENDED RELEASE ORAL ONCE
Refills: 0 | Status: DISCONTINUED | OUTPATIENT
Start: 2024-07-19 | End: 2024-07-19

## 2024-07-19 RX ADMIN — ONDANSETRON HYDROCHLORIDE 4 MILLIGRAM(S): 2 INJECTION INTRAMUSCULAR; INTRAVENOUS at 20:30

## 2024-07-19 RX ADMIN — MORPHINE SULFATE 4 MILLIGRAM(S): 100 TABLET, EXTENDED RELEASE ORAL at 13:23

## 2024-07-19 RX ADMIN — MORPHINE SULFATE 6 MILLIGRAM(S): 100 TABLET, EXTENDED RELEASE ORAL at 14:28

## 2024-07-19 RX ADMIN — Medication 1000 MILLILITER(S): at 15:43

## 2024-07-19 RX ADMIN — HYDROMORPHONE HCL 0.5 MILLIGRAM(S): 0.2 INJECTION, SOLUTION INTRAVENOUS at 21:54

## 2024-07-19 RX ADMIN — KETOROLAC TROMETHAMINE 15 MILLIGRAM(S): 30 INJECTION, SOLUTION INTRAMUSCULAR at 13:24

## 2024-07-19 RX ADMIN — HYDROMORPHONE HCL 1 MILLIGRAM(S): 0.2 INJECTION, SOLUTION INTRAVENOUS at 15:29

## 2024-07-19 RX ADMIN — Medication 2000 MILLILITER(S): at 13:25

## 2024-07-19 RX ADMIN — TAMSULOSIN HYDROCHLORIDE 0.4 MILLIGRAM(S): 0.4 CAPSULE ORAL at 15:42

## 2024-07-19 NOTE — ED ADULT NURSE NOTE - OBJECTIVE STATEMENT
pt a&ox3 c/o sharp colicky R. flank pain x3 hours, similar to kidney stone pain in the past. denies hematuria/dysuria, NVD. will continue to monitor.     PMH kidney stones requiring lithotripsy and stent placement

## 2024-07-19 NOTE — ED ADULT NURSE NOTE - NSFALLUNIVINTERV_ED_ALL_ED
Bed/Stretcher in lowest position, wheels locked, appropriate side rails in place/Call bell, personal items and telephone in reach/Instruct patient to call for assistance before getting out of bed/chair/stretcher/Non-slip footwear applied when patient is off stretcher/[place] to call system/Physically safe environment - no spills, clutter or unnecessary equipment/Purposeful proactive rounding/Room/bathroom lighting operational, light cord in reach

## 2024-07-19 NOTE — ED ADULT NURSE NOTE - CHPI ED NUR DURATION
sURGERY Progress Note      SUBJECTIVE    Ms. Downing is a 32 year old female doing okay, persistent pain in abdomen, rated 7/10.  Hypertension overnight, pulse is within normal.  Mild nausea but no vomiting overnight.  No flatus.  No fever/ chills.  Elevated lipase and WBC today    Physical Exam    Vital Signs   Last Value 24 Hour Range   Temperature 97.6 °F (36.4 °C) (05/30/20 0831) Temp  Min: 97.6 °F (36.4 °C)  Max: 98.8 °F (37.1 °C)   Pulse 65 (05/30/20 0831) Pulse  Min: 61  Max: 68   Respiratory 19 (05/30/20 1259) Resp  Min: 14  Max: 20   Blood Pressure (!) 168/104 (05/30/20 1046) BP  Min: 154/79  Max: 226/113   Pulse Oximetry 98 % (05/30/20 0831) SpO2  Min: 97 %  Max: 99 %   CVP   No data recorded     Weight: 107.6 kg (05/29/20 1014)  Admit weight:Weight: 105.6 kg (05/28/20 0620)    General: Alert and oriented ×3,  no acute distressed.   Cardiovascular:  Normal heart rate. Normal rhythm.  Respiratory:  Normal breath sounds. No respiratory distress. No wheezing. No chest tenderness.    Abdomen:  Soft, bowel sounds are hypoactive, tenderness to the mid epigastric and left upper quadrant.  Mild guarding but no rebound tenderness.  Extremities:  No edema      Intake & Output    This Shift 24 Hour Range   I/O this shift:  In: 4891 [I.V.:4891]  Out: 1000 [Urine:1000] I/O last 3 completed shifts:  In: 6702.2 [I.V.:6702.2]  Out: 2100 [Urine:2100]     Last Stool Occurrence:      Labs    Recent Labs   Lab 05/30/20  0631 05/29/20  0539 05/28/20  0639 05/28/20  0628   WBC 13.0* 5.5  --  6.3   HCT 37.9 35.5*  --  36.8   HGB 12.8 11.7*  --  12.5    208  --  249   SODIUM 134* 138  --  136   POTASSIUM 4.0 3.7  --  4.2   CHLORIDE 100 103  --  99   CO2 25 26  --  25   CALCIUM 8.2* 8.2*  --  8.8   GLUCOSE 107* 81  --  111*   BUN 6 4*  --  7   CREATININE 0.65 0.80 0.80 0.79   AST 79* 154*  --  358*   * 369*  --  541*   ALKPT 122* 120*  --  134*   BILIRUBIN 1.2* 3.0*  --  3.4*   ALBUMIN 3.4* 3.3*  --  4.0   LIPA 1,329*   --   --  63*       IMAGING STUDIES & OTHER STUDIES   EXAMINATION:  CT ABDOMEN PELVIS WO CONTRAST     TECHNIQUE: Contiguous axial noncontrast images of the abdomen and pelvis  were obtained from the lung bases to the level of the lesser trochanters  using routine protocol.     One or more of the following dose reduction techniques were used: automated  exposure control, adjustment of the mA and/or kV according to patient size,  use of iterative reconstruction.     CLINICAL HISTORY:  32 years old Female presents with pain, nonlocalized  after ERCP. Assess for pericarditis and perforation..      COMPARISON: None.      LUNG BASES:  The visualized lung bases demonstrate nodular opacity in the medial RIGHT  lower lobe which has the appearance of atelectasis with additional patchy  opacity LEFT lower lobe and lingula, presumably also atelectasis.       CT ABDOMEN:   Evaluation of the solid abdominal viscera is markedly limited by lack of IV  contrast.      LIVER AND BILIARY SYSTEM: Hepatic steatosis is present without evidence for  cirrhosis     GALLBLADDER: Sludge and gallstones are present. There is also contrast from  vicarious excretion.     PANCREAS:      The pancreas is inhomogeneous and enlarged with peripancreatic inflammation  at the level of the pancreatic neck, head and uncinate process. There is  also a small peripancreatic fluid collection with several tiny 1 to 2 mm  foci of gas on images 74-77 of series 2 and 65-68 of series 5, annotated  with arrows. These have the appearance of tiny foci of  extraluminal/intraperitoneal gas superimposed on pancreatitis.     SPLEEN: No splenomegaly.  ADRENALS: No suspicious adrenal mass lesions.   KIDNEYS AND URETERS:   There is no hydronephrosis, nephrolithiasis or ureterolithiasis.      CT PELVIS:  Uterus is present. Tampon is present in the vaginal vault.        The bladder demonstrates no suspicious wall thickening or polypoid mass  lesion.       BOWEL: Evaluation of  the bowel is limited secondary to non administration  of oral contrast.     The appendix is normal.    No small or large bowel obstruction.    No evidence for  diverticulitis.   ADDITIONAL MISCELLANEOUS FINDINGS:  No enlarged by size criteria lymph nodes. No abdominal aortic aneurysm.  OSSEOUS STRUCTURES:  No suspicious lytic or blastic osseous lesions.  No significant vertebral  body compression fractures.        IMPRESSION:     1. The pancreas is inhomogeneous and enlarged with peripancreatic  inflammation at the level of the pancreatic neck, head and uncinate  process. There is also a small peripancreatic fluid collection with several  tiny 1 to 2 mm foci of gas on images 74-77 of series 2 and 65-68 of series  5, annotated with arrows. These have the appearance of tiny foci of  extraluminal/intraperitoneal gas superimposed on pancreatitis.        2. Cholelithiasis. Probable atelectasis in the lower lobes.    Assessment      Choledocholithiasis, status post ERCP  Suspected micro perforation   Acute pancreatitis secondary to ERCP    Plan      Abdominal pain stable  Agree with abdominal x-ray in a.m.  Keep straight NPO  Add antibiotics (micro perforation)  Hold cholecystectomy  Plan discussed with patient    Tomasa Armenta DO 5/30/2020     [phone]   today

## 2024-07-19 NOTE — ED PROVIDER NOTE - OBJECTIVE STATEMENT
37yo M hx of kidney stones requiring lithotripsy and stent placement in the past presents with sharp colicky R flank pain for 2.5hrs radiating to R hip, similar to kidney stone pain in the past.  No hematuria.  No dysuria.  Notes he has been drinking a lot of water without urinating as much as he would expect.  No fever, chills, nausea, vomiting, or diarrhea.

## 2024-07-19 NOTE — ED ADULT NURSE REASSESSMENT NOTE - NS ED NURSE REASSESS COMMENT FT1
Patient received from RN Danay, Patient is alert and responsive, no signs of acute distress noted. Patient at ED for abdominal pain, Patient is being admitted for kidney stones, Patient C/O nausea and abdominal pain,  was medicated with zofran, and hydromorphone, Patient was also given food and made comfortable.

## 2024-07-19 NOTE — ED PROVIDER NOTE - CLINICAL SUMMARY MEDICAL DECISION MAKING FREE TEXT BOX
Pt w hx of kidney stone requiring lithotripsy and stent placement in the past presents with 2.5hrs of sharp colicky R flank pain radiating to R hip, similar to kidney stone pain in the past.  on exam afebrile, VSS, uncomfortable appearing, no abd TTP, no CVA TTP.  r/o kidney stone.  Ddx appy vs. other.  Plan for labs, CT a/p, analgesia, IV hydration, reassess.

## 2024-07-19 NOTE — ED PROVIDER NOTE - PROGRESS NOTE DETAILS
CT shows 11mm stone at R UPJ.  No UTI on UA.  Pt does not feel well enough to go home, has required repeated doses of opiate analgesia.  Will admit to urology at [facility].

## 2024-07-20 ENCOUNTER — TRANSCRIPTION ENCOUNTER (OUTPATIENT)
Age: 39
End: 2024-07-20

## 2024-07-20 VITALS
SYSTOLIC BLOOD PRESSURE: 110 MMHG | DIASTOLIC BLOOD PRESSURE: 55 MMHG | TEMPERATURE: 98 F | RESPIRATION RATE: 18 BRPM | OXYGEN SATURATION: 97 % | HEART RATE: 62 BPM

## 2024-07-20 LAB
ANION GAP SERPL CALC-SCNC: 8 MMOL/L — SIGNIFICANT CHANGE UP (ref 5–17)
APTT BLD: 30 SEC — SIGNIFICANT CHANGE UP (ref 24.5–35.6)
BLD GP AB SCN SERPL QL: NEGATIVE — SIGNIFICANT CHANGE UP
BUN SERPL-MCNC: 14 MG/DL — SIGNIFICANT CHANGE UP (ref 7–23)
CALCIUM SERPL-MCNC: 8.5 MG/DL — SIGNIFICANT CHANGE UP (ref 8.4–10.5)
CHLORIDE SERPL-SCNC: 108 MMOL/L — SIGNIFICANT CHANGE UP (ref 96–108)
CO2 SERPL-SCNC: 24 MMOL/L — SIGNIFICANT CHANGE UP (ref 22–31)
CREAT SERPL-MCNC: 0.88 MG/DL — SIGNIFICANT CHANGE UP (ref 0.5–1.3)
EGFR: 114 ML/MIN/1.73M2 — SIGNIFICANT CHANGE UP
GLUCOSE SERPL-MCNC: 108 MG/DL — HIGH (ref 70–99)
HCT VFR BLD CALC: 40.1 % — SIGNIFICANT CHANGE UP (ref 39–50)
HGB BLD-MCNC: 13.7 G/DL — SIGNIFICANT CHANGE UP (ref 13–17)
INR BLD: 0.95 — SIGNIFICANT CHANGE UP (ref 0.85–1.18)
MAGNESIUM SERPL-MCNC: 2.1 MG/DL — SIGNIFICANT CHANGE UP (ref 1.6–2.6)
MCHC RBC-ENTMCNC: 32.5 PG — SIGNIFICANT CHANGE UP (ref 27–34)
MCHC RBC-ENTMCNC: 34.2 GM/DL — SIGNIFICANT CHANGE UP (ref 32–36)
MCV RBC AUTO: 95 FL — SIGNIFICANT CHANGE UP (ref 80–100)
NRBC # BLD: 0 /100 WBCS — SIGNIFICANT CHANGE UP (ref 0–0)
PHOSPHATE SERPL-MCNC: 3.4 MG/DL — SIGNIFICANT CHANGE UP (ref 2.5–4.5)
PLATELET # BLD AUTO: 254 K/UL — SIGNIFICANT CHANGE UP (ref 150–400)
POTASSIUM SERPL-MCNC: 3.6 MMOL/L — SIGNIFICANT CHANGE UP (ref 3.5–5.3)
POTASSIUM SERPL-SCNC: 3.6 MMOL/L — SIGNIFICANT CHANGE UP (ref 3.5–5.3)
PROTHROM AB SERPL-ACNC: 10.8 SEC — SIGNIFICANT CHANGE UP (ref 9.5–13)
RBC # BLD: 4.22 M/UL — SIGNIFICANT CHANGE UP (ref 4.2–5.8)
RBC # FLD: 12.1 % — SIGNIFICANT CHANGE UP (ref 10.3–14.5)
RH IG SCN BLD-IMP: POSITIVE — SIGNIFICANT CHANGE UP
SODIUM SERPL-SCNC: 140 MMOL/L — SIGNIFICANT CHANGE UP (ref 135–145)
WBC # BLD: 9.36 K/UL — SIGNIFICANT CHANGE UP (ref 3.8–10.5)
WBC # FLD AUTO: 9.36 K/UL — SIGNIFICANT CHANGE UP (ref 3.8–10.5)

## 2024-07-20 PROCEDURE — 99285 EMERGENCY DEPT VISIT HI MDM: CPT

## 2024-07-20 PROCEDURE — 71045 X-RAY EXAM CHEST 1 VIEW: CPT | Mod: 26

## 2024-07-20 PROCEDURE — 81001 URINALYSIS AUTO W/SCOPE: CPT

## 2024-07-20 PROCEDURE — 83690 ASSAY OF LIPASE: CPT

## 2024-07-20 PROCEDURE — 86901 BLOOD TYPING SEROLOGIC RH(D): CPT

## 2024-07-20 PROCEDURE — 80053 COMPREHEN METABOLIC PANEL: CPT

## 2024-07-20 PROCEDURE — 85730 THROMBOPLASTIN TIME PARTIAL: CPT

## 2024-07-20 PROCEDURE — 87086 URINE CULTURE/COLONY COUNT: CPT

## 2024-07-20 PROCEDURE — 85025 COMPLETE CBC W/AUTO DIFF WBC: CPT

## 2024-07-20 PROCEDURE — 96375 TX/PRO/DX INJ NEW DRUG ADDON: CPT

## 2024-07-20 PROCEDURE — 99222 1ST HOSP IP/OBS MODERATE 55: CPT

## 2024-07-20 PROCEDURE — 93005 ELECTROCARDIOGRAM TRACING: CPT

## 2024-07-20 PROCEDURE — 36415 COLL VENOUS BLD VENIPUNCTURE: CPT

## 2024-07-20 PROCEDURE — 86850 RBC ANTIBODY SCREEN: CPT

## 2024-07-20 PROCEDURE — 85027 COMPLETE CBC AUTOMATED: CPT

## 2024-07-20 PROCEDURE — 85610 PROTHROMBIN TIME: CPT

## 2024-07-20 PROCEDURE — 74176 CT ABD & PELVIS W/O CONTRAST: CPT | Mod: MC

## 2024-07-20 PROCEDURE — 86900 BLOOD TYPING SEROLOGIC ABO: CPT

## 2024-07-20 PROCEDURE — 83735 ASSAY OF MAGNESIUM: CPT

## 2024-07-20 PROCEDURE — 84100 ASSAY OF PHOSPHORUS: CPT

## 2024-07-20 PROCEDURE — 80048 BASIC METABOLIC PNL TOTAL CA: CPT

## 2024-07-20 PROCEDURE — 96374 THER/PROPH/DIAG INJ IV PUSH: CPT

## 2024-07-20 PROCEDURE — 71045 X-RAY EXAM CHEST 1 VIEW: CPT

## 2024-07-20 PROCEDURE — 96376 TX/PRO/DX INJ SAME DRUG ADON: CPT

## 2024-07-20 RX ORDER — SODIUM CHLORIDE 0.9 % (FLUSH) 0.9 %
1000 SYRINGE (ML) INJECTION
Refills: 0 | Status: DISCONTINUED | OUTPATIENT
Start: 2024-07-20 | End: 2024-07-20

## 2024-07-20 RX ORDER — ONDANSETRON HYDROCHLORIDE 2 MG/ML
4 INJECTION INTRAMUSCULAR; INTRAVENOUS EVERY 6 HOURS
Refills: 0 | Status: DISCONTINUED | OUTPATIENT
Start: 2024-07-20 | End: 2024-07-20

## 2024-07-20 RX ORDER — ACETAMINOPHEN 325 MG
650 TABLET ORAL EVERY 6 HOURS
Refills: 0 | Status: DISCONTINUED | OUTPATIENT
Start: 2024-07-20 | End: 2024-07-20

## 2024-07-20 RX ORDER — KETOROLAC TROMETHAMINE 30 MG/ML
15 INJECTION, SOLUTION INTRAMUSCULAR EVERY 6 HOURS
Refills: 0 | Status: DISCONTINUED | OUTPATIENT
Start: 2024-07-20 | End: 2024-07-20

## 2024-07-20 RX ORDER — EMTRICITABINE AND TENOFOVIR DISOPROXIL FUMARATE 200; 300 MG/1; MG/1
1 TABLET, FILM COATED ORAL DAILY
Refills: 0 | Status: DISCONTINUED | OUTPATIENT
Start: 2024-07-20 | End: 2024-07-20

## 2024-07-20 RX ORDER — OXYCODONE HYDROCHLORIDE 100 MG/5ML
1 SOLUTION ORAL
Qty: 6 | Refills: 0
Start: 2024-07-20 | End: 2024-07-21

## 2024-07-20 RX ORDER — MORPHINE SULFATE 100 MG/1
2 TABLET, EXTENDED RELEASE ORAL EVERY 4 HOURS
Refills: 0 | Status: DISCONTINUED | OUTPATIENT
Start: 2024-07-20 | End: 2024-07-20

## 2024-07-20 RX ORDER — ACETAMINOPHEN 325 MG
2 TABLET ORAL
Qty: 0 | Refills: 0 | DISCHARGE
Start: 2024-07-20

## 2024-07-20 RX ORDER — OXYCODONE HYDROCHLORIDE 100 MG/5ML
5 SOLUTION ORAL EVERY 8 HOURS
Refills: 0 | Status: DISCONTINUED | OUTPATIENT
Start: 2024-07-20 | End: 2024-07-20

## 2024-07-20 RX ADMIN — METOCLOPRAMIDE 10 MILLIGRAM(S): 5 SOLUTION ORAL at 00:23

## 2024-07-20 RX ADMIN — Medication 75 MILLILITER(S): at 03:23

## 2024-07-20 RX ADMIN — Medication 90 MILLILITER(S): at 02:08

## 2024-07-20 NOTE — H&P ADULT - NSHPLABSRESULTS_GEN_ALL_CORE
15.2   13.50 )-----------( 305      ( 2024 12:56 )             43.9   07-    140  |  104  |  18  ----------------------------<  118<H>  4.1   |  28  |  1.17    Ca    9.2      2024 12:56    TPro  7.2  /  Alb  3.7  /  TBili  0.5  /  DBili  x   /  AST  51<H>  /  ALT  29  /  AlkPhos  96  07-19  Urinalysis Basic - ( 2024 12:56 )    Color: Yellow / Appearance: Cloudy / S.030 / pH: x  Gluc: 118 mg/dL / Ketone: 40 mg/dL  / Bili: Negative / Urobili: 1.0 mg/dL   Blood: x / Protein: 30 mg/dL / Nitrite: Negative   Leuk Esterase: Small / RBC: >100 /HPF / WBC 5 /HPF   Sq Epi: x / Non Sq Epi: x / Bacteria: Moderate /HPF      FINDINGS:  LOWER CHEST: Within normal limits.    LIVER: Within normal limits.  BILE DUCTS: Normal caliber.  GALLBLADDER: Within normal limits.  SPLEEN: Within normal limits.  PANCREAS: Within normal limits.  ADRENALS: Within normal limits.  KIDNEYS/URETERS: 11 mm stone at right ureteropelvic junction with mild   hydronephrosis. Mild pelvicalyceal and perinephric stranding. Additional   nonobstructing right upper pole stone 4 mm. No left-sided calculi or   hydronephrosis.    BLADDER: Within normal limits.  REPRODUCTIVE ORGANS: Prostate within normal limits.    BOWEL: No bowel obstruction. Appendix is not visualized. No evidence of   inflammation in the pericecal region.  PERITONEUM/RETROPERITONEUM: Within normal limits.  VESSELS: Within normal limits.  LYMPH NODES: No lymphadenopathy.  ABDOMINAL WALL: Within normal limits.  BONES: Within normal limits.    IMPRESSION:  Obstructing stone 11 mm at right ureteropelvic junction with mild   hydronephrosis.

## 2024-07-20 NOTE — H&P ADULT - ASSESSMENT
38M hx kidney stones (s/p ESWL w/ ureteral stent placement 7 years ago) presents with R flank pain x 1 day. Pain is similar to kidney stone pain in the past. + N/V 2/2 pain. Denies fevers, chills, dysuria, hematuria. issues urinating/ voiding. Pt is afebrile, hemodynamically stable. Labs show wbc 13.5 Cr 1.17 UA neg nit/ small leukocyte esterase. CT shows 1.1 cm R UPJ stone + hydro.    Plan:  -admit to Urology regional floor Dr. Spangler 2/2 intractable pain  -NPO, IVF  -preop labs  -pain and nausea control  -possible add on for ureteral stent placement in AM   -F/U am labs

## 2024-07-20 NOTE — H&P ADULT - NSHPPHYSICALEXAM_GEN_ALL_CORE
Gen: awake and alert  lungs: clear to ausculation bilaterally  heart: normal s1 and s2  abd: nontender, nondistended  : no suprapubic/CVAT + voiding yes

## 2024-07-20 NOTE — PATIENT PROFILE ADULT - NSTOBACCO QUIT READY_GEN_A_CORE_SD
patient refused to answer, states he smokes only socially. patient refused to give packs/day & # of years smoking.

## 2024-07-20 NOTE — DISCHARGE NOTE PROVIDER - NSDCMRMEDTOKEN_GEN_ALL_CORE_FT
Adderall:   brompheniramine/dextromethorphan/pseudoephedrine 4 mg-30 mg-60 mg/5 mL oral syrup: 5 milliliter(s) orally every 6 hours   cyclobenzaprine 10 mg oral tablet: 1 tab(s) orally 3 times a day as needed for  muscle spasm  ibuprofen 600 mg oral tablet: 1 tab(s) orally every 6 hours  ibuprofen 600 mg oral tablet: 1 tab(s) orally every 6 hours, As Needed   Tamiflu 75 mg oral capsule: 1 cap(s) orally 2 times a day   Truvada 200 mg-300 mg oral tablet: 1 tab(s) orally once a day  Xanax:    acetaminophen 325 mg oral tablet: 2 tab(s) orally every 6 hours As needed Temp greater or equal to 38C (100.4F), Mild Pain (1 - 3)  Adderall:   ibuprofen 600 mg oral tablet: 1 tab(s) orally every 6 hours, As Needed   oxyCODONE 5 mg oral tablet: 1 tab(s) orally every 8 hours as needed for  severe pain MDD: 3 tabs  Truvada 200 mg-300 mg oral tablet: 1 tab(s) orally once a day  Xanax:

## 2024-07-20 NOTE — PATIENT PROFILE ADULT - NSPRESCRALCSCORE_GEN_A_NUR_CAL
[] : A student assisted with documenting this visit. I have reviewed and verified all information documented by the student, and made modifications to such information, when appropriate. 2

## 2024-07-20 NOTE — H&P ADULT - ATTENDING COMMENTS
the pt was seen and examined. He has no pain at the time of exam and was sleeping initially. We discussed stent vs not stenting him and while he initially first wanted an ESWL, we explained that there are other options. He is still leaning toward ESWL but wants to hear about other options.  After thinking about his options he opted to go home and return for definitive treatment instead of having a stent (he has had them before and did not like them). He knows that with almost any surgery he opts for, he will need a stent post op

## 2024-07-20 NOTE — DISCHARGE NOTE NURSING/CASE MANAGEMENT/SOCIAL WORK - PATIENT PORTAL LINK FT
You can access the FollowMyHealth Patient Portal offered by [facility] by registering at the following website: [email or web address]. By joining [facility]’s FollowMyHealth portal, you will also be able to view your health information using other applications (apps) compatible with our system.

## 2024-07-20 NOTE — DISCHARGE NOTE PROVIDER - HOSPITAL COURSE
39yo male with PMH of kidney stones presenting with flank pain. Patient denies fevers or chills. On CT found to have 1.1cm right RVJ stone with hydronephrosis. Patient admitted for pain control and possible stent. Patient seen by attending urologist Dr. Spangler. Patient educated on all possible treatment options currently which involve ureteral stent. Patient states he does not want a stent. Patient states he would like to be discharged and follow up outpatient for definitive stone management. Patient educated to return to ED if he has any worsening pain not controlled with medications, not tolerating PO, fevers or chills. Patient's VSS and he is hemodynamically stable.

## 2024-07-20 NOTE — DISCHARGE NOTE NURSING/CASE MANAGEMENT/SOCIAL WORK - NSDCPEFALRISK_GEN_ALL_CORE
For information on Fall & Injury Prevention, visit: [email or web address] OR  [email or web address] OR  https://www.cdc.gov/steadi/patient.html

## 2024-07-20 NOTE — PATIENT PROFILE ADULT - NSPROGENOTHERPROVIDER_GEN_A_NUR
Subjective:       Patient ID: Chris Hill Jr. is a 72 y.o. male.    Chief Complaint: Follow-up (3 m  fu bw res.)    Using his aspirin regularly.  Coronary artery disease.  No anginal chest pain.  Hypertension controlled.  Hyperlipidemia.  Holding his medication currently.  Vertigo central vertigo.  Seeing Dr. Ramirez.  On Topamax now.  Diabetes mellitus type 2 with retinopathy.  Obstructive sleep apnea on his CPAP regularly.  Hypertriglyceridemia with triglycerides of 519.  Cholesterol 201 LDL is 144.  A1c is 6 GFR 59 CBC is normal.  Some diarrhea.  Took antibiotics for month for sinusitis.  Nasal surgery is needed.  Diarrhea is daily.  He decrease his metformin to 1 per day without any relief.  Myalgia.  Holding Crestor due to this.    Physical examination.  Vital signs noted.  No acute distress.  Neck without bruit.  Chest clear.  Heart regular rate and rhythm.  Abdomen bowel sounds are positive soft nontender.  Extremities without edema positive pulses 2+.  Vibratory and position sense intact.  No calluses skin breakdown or ulcerations.  Sensation intact 5 of 5 spots tested each foot.      Objective:        Assessment:       1. Hypertension, essential    2. LORA on CPAP    3. Hyperlipidemia, unspecified hyperlipidemia type    4. Aspirin long-term use    5. Mild CAD    6. Vertigo    7. Type 2 diabetes mellitus with retinopathy of both eyes, without long-term current use of insulin, macular edema presence unspecified, unspecified retinopathy severity    8. Myalgia due to statin    9. Hypertriglyceridemia    10. Diarrhea, unspecified type        Plan:       Hypertension, essential    LORA on CPAP    Hyperlipidemia, unspecified hyperlipidemia type    Aspirin long-term use    Mild CAD    Vertigo    Type 2 diabetes mellitus with retinopathy of both eyes, without long-term current use of insulin, macular edema presence unspecified, unspecified retinopathy severity    Myalgia due to  statin    Hypertriglyceridemia    Diarrhea, unspecified type    Other orders  -     rosuvastatin (CRESTOR) 10 MG tablet; Take 1 tablet (10 mg total) by mouth once daily.  Dispense: 90 tablet; Refill: 1  -     metroNIDAZOLE (FLAGYL) 250 MG tablet; Take 1 tablet (250 mg total) by mouth 4 (four) times daily.  Dispense: 40 tablet; Refill: 0  -     amoxicillin-clavulanate 875-125mg (AUGMENTIN) 875-125 mg per tablet; Take 1 tablet by mouth 2 (two) times daily.  Dispense: 20 tablet; Refill: 0      Refill medications.  Decrease the Crestor to 10 mg daily.  Stool for Clostridium difficile.  Flagyl 250 mg q.i.d. for 10 days.  Continue follow-up with Dr. Heredia.     none

## 2024-07-20 NOTE — DISCHARGE NOTE PROVIDER - CARE PROVIDER_API CALL
Jamia Spangler   Urology  [address] [place] [id number]  Phone: [phone]  Fax: [phone]  Follow Up Time:

## 2024-07-20 NOTE — DISCHARGE NOTE PROVIDER - NSDCFUADDINST_GEN_ALL_CORE_FT
General Discharge Instructions:  Use Tylenol for pain control as needed  Please resume all regular home medications unless specifically advised not to take a particular medication. Also, please take any new medications as prescribed.  Please get plenty of rest, continue to ambulate several times per day, and drink adequate amounts of fluids.     Warning Signs:  Please call your doctor if you experience the following:  *You experience new chest pain, pressure, squeezing or tightness.  *New or worsening cough, shortness of breath, or wheeze.  *If you are vomiting and cannot keep down fluids or your medications.  *You are getting dehydrated due to continued vomiting, diarrhea, or other reasons. Signs of dehydration include dry mouth, rapid heartbeat, or feeling dizzy or faint when standing.  *You see blood or dark/black material when you vomit or have a bowel movement.  *You experience burning when you urinate, have blood in your urine, or experience a discharge.  *Your pain is not improving within 8-12 hours or is not gone within 24 hours. Call or return immediately if your pain is getting worse, changes location, or moves to your chest or back.  *You have shaking chills, or fever greater than 100.4 degrees Fahrenheit.  *Any change in your symptoms, or any new symptoms that concern you   General Discharge Instructions:  Use Tylenol for pain control as needed. alternate with Ibuprofen as needed. Only take the oxycodone for severe breakthrough pain that is not managed with the tylenol or ibuprofen. If you take the oxycodone please read the packet it comes with for further instructions. Do not take oxycodone and your home prescription of Xanax concomitantly.     Please resume all regular home medications unless specifically advised not to take a particular medication. Also, please take any new medications as prescribed.  Please get plenty of rest, continue to ambulate several times per day, and drink adequate amounts of fluids.     Warning Signs:  Please call your doctor if you experience the following:  *You experience new chest pain, pressure, squeezing or tightness.  *New or worsening cough, shortness of breath, or wheeze.  *If you are vomiting and cannot keep down fluids or your medications.  *You are getting dehydrated due to continued vomiting, diarrhea, or other reasons. Signs of dehydration include dry mouth, rapid heartbeat, or feeling dizzy or faint when standing.  *You see blood or dark/black material when you vomit or have a bowel movement.  *You experience burning when you urinate, have blood in your urine, or experience a discharge.  *Your pain is not improving within 8-12 hours or is not gone within 24 hours. Call or return immediately if your pain is getting worse, changes location, or moves to your chest or back.  *You have shaking chills, or fever greater than 100.4 degrees Fahrenheit.  *Any change in your symptoms, or any new symptoms that concern you

## 2024-07-20 NOTE — H&P ADULT - HISTORY OF PRESENT ILLNESS
38M hx kidney stones (s/p ESWL w/ ureteral stent placement 7 years ago) presents with R flank pain x 1 day. Pain is similar to kidney stone pain in the past. + N/V 2/2 pain. Denies fevers, chills, dysuria, hematuria. issues urinating/ voiding.

## 2024-07-20 NOTE — PATIENT PROFILE ADULT - FUNCTIONAL ASSESSMENT - BASIC MOBILITY 6.
4-calculated by average/Not able to assess (calculate score using [facility] averaging method)

## 2024-07-21 LAB
CULTURE RESULTS: SIGNIFICANT CHANGE UP
SPECIMEN SOURCE: SIGNIFICANT CHANGE UP

## 2024-07-24 ENCOUNTER — APPOINTMENT (OUTPATIENT)
Dept: UROLOGY | Facility: CLINIC | Age: 39
End: 2024-07-24
Payer: COMMERCIAL

## 2024-07-24 VITALS
WEIGHT: 168 LBS | BODY MASS INDEX: 22.26 KG/M2 | TEMPERATURE: 98.2 F | OXYGEN SATURATION: 98 % | HEIGHT: 73 IN | DIASTOLIC BLOOD PRESSURE: 65 MMHG | HEART RATE: 67 BPM | SYSTOLIC BLOOD PRESSURE: 127 MMHG

## 2024-07-24 DIAGNOSIS — N20.0 CALCULUS OF KIDNEY: ICD-10-CM

## 2024-07-24 DIAGNOSIS — F17.200 NICOTINE DEPENDENCE, UNSPECIFIED, UNCOMPLICATED: ICD-10-CM

## 2024-07-24 PROCEDURE — 99214 OFFICE O/P EST MOD 30 MIN: CPT

## 2024-07-24 RX ORDER — UBIDECARENONE/VIT E ACET 100MG-5
CAPSULE ORAL
Refills: 0 | Status: ACTIVE | COMMUNITY

## 2024-07-24 RX ORDER — KETOROLAC TROMETHAMINE 10 MG/1
10 TABLET, FILM COATED ORAL EVERY 6 HOURS
Qty: 20 | Refills: 0 | Status: ACTIVE | COMMUNITY
Start: 2024-07-24 | End: 1900-01-01

## 2024-07-24 RX ORDER — EMTRICITABINE AND TENOFOVIR ALAFENAMIDE 120; 15 MG/1; MG/1
TABLET ORAL
Refills: 0 | Status: ACTIVE | COMMUNITY

## 2024-07-24 RX ORDER — ALPRAZOLAM 2 MG/1
TABLET ORAL
Refills: 0 | Status: ACTIVE | COMMUNITY

## 2024-07-24 RX ORDER — MULTIVIT-MIN/IRON/FOLIC ACID/K 18-600-40
CAPSULE ORAL
Refills: 0 | Status: ACTIVE | COMMUNITY

## 2024-07-24 NOTE — ASSESSMENT
[FreeTextEntry1] : Assessment:   IMANI RAMIREZ is a 37 y/o male with a 1.1 cm stone in the right UPH  I discussed the management of urolithiasis with the patient:   Watchful Waiting +/- Medical Expulsive Therapy (MET): We can continue to watch the stone and will generally give up to 4 weeks for stone passage. The likelihood of passage goes down with increased stone size and more proximal location. However, I explained that there is always a risk that the stone could become more symptomatic (pain, infection) as it passes or not pass at all, which would require surgical treatment. MET is a conservative option wherein medications (most commonly, an alpha-blocker) and analgesia are prescribed to help expedite the passage of the ureteral stone. In general, it is reserved for distal stones between 6-10 mm, though contemporary scientific evidence is conflicting. The strength of the evidence for MET in small (<6 mm) stones in the distal ureter is weak, though it might still have a clinical benefit in larger ureteral stones (>5 mm). At the same time, risks associated with short-course alpha-blocker therapy are very low and likely outweighed by the potential upside of hastening stone passage.   Shock Wave Lithotripsy (SWL):   This is the least invasive form of surgery for stones and an excellent option for select stones. For ureteral stones, SWL is limited to the upper ureter. I explained how the procedure is performed and the concept behind shock waves. Procedural success is dependent on several stone and environmental factors such as the stone composition or density on CT, the presence or absence of hydronephrosis, size of the stone, stone location, and skin-to-stone distance on CT scan (patients body habitus). For these reasons, not all stones/patients are good candidates for SWL. The chances of being stone free after SWL are often much lower compared to other modalities, such as ureteroscopy, except in select cases where stone-free rates are comparable. Therefore, there is a risk that the patient would need subsequent procedures to render them stone-free. Since this is a non-invasive procedure, we are relying on the kidney to spontaneously pass the resultant stone fragments. At the same time, this procedure does carry some perioperative risks, mainly bleeding and infection, as well as the small risk of developing obstruction due to passage of stone fragments, which could require urgent placement of a double-J ureteral stent or nephrostomy tube.   Ureteroscopy (URS):   I explained the technique in detail and how it is performed. Though more invasive than SWL, stone-free rates are higher with this procedure for many stones, including those in the distal ureter and larger stones in the kidney and other segments of the ureter. Unlike SWL, its success rate is far less dependent on stone hardness, an unknown in many patients until the time of surgery. With regards to surgical outcomes, true (100%) stone-free rates approach 90% for ureteral stones and are more conservative (~60%) for renal stones - importantly, these rates are directly correlated to the total stone size/burden. Very commonly, a ureteral stent is left in place at the conclusion of the procedure, but only if needed. I explained that up to 80% of patients have some degree of stent-related symptoms, including flank pain, urinary urgency, pain with urination, blood in the urine, or general discomfort. If a stent is placed, it would need to be removed either cystoscopically under local anesthesia or it may have a string left externally through the urethra for removal in the office approximately 1 week after the procedure.  Risks of ureteroscopy include, but are not limited to, infection (5%), bleeding, injury to the bladder or ureter, ureteral perforation, ureteral stricture, and other risks involved with general anesthesia. There is also the risk that the procedure needs to be staged into more than one session based on the patient's internal anatomy and the size of the stone(s). Finally, dilation of the ureter and/or ureteral stent placement prior to definitive ureteroscopy may be necessary to achieve ureteral access safely.   I explained all these options to the patient and my assessment of the risks and benefits of each procedure for their stone(s).    I have answered all the patients questions and the patient has elected to undergo a shockwave lithotripsy (SWL).     Plan: -Schedule for Right SWL 8/13 -Pre-operative labs, UA/UCx already completed -No medical clearance requested  - KUB ordered

## 2024-07-24 NOTE — ADDENDUM
[FreeTextEntry1] : I, Dr. Alex Carbajal, personally performed the evaluation and management (E/M) services for this new patient. This includes conducting the clinically appropriate initial history &/or physical exam, assessing all conditions, and establishing the care plan. My NP, Debby Jim, was here to observe my E/M services for this patient.I, Dr. Alex Carbajal, personally performed the evaluation and management (E/M) services for this new patient. This includes conducting the clinically appropriate initial history &/or physical exam, assessing all conditions, and establishing the care plan. My NP, Debby Jim, was here to observe my E/M services for this patient.

## 2024-07-24 NOTE — ASSESSMENT
[FreeTextEntry1] : Assessment:   IMANI RAMIREZ is a 39 y/o male with a 1.1 cm stone in the right UPH  I discussed the management of urolithiasis with the patient:   Watchful Waiting +/- Medical Expulsive Therapy (MET): We can continue to watch the stone and will generally give up to 4 weeks for stone passage. The likelihood of passage goes down with increased stone size and more proximal location. However, I explained that there is always a risk that the stone could become more symptomatic (pain, infection) as it passes or not pass at all, which would require surgical treatment. MET is a conservative option wherein medications (most commonly, an alpha-blocker) and analgesia are prescribed to help expedite the passage of the ureteral stone. In general, it is reserved for distal stones between 6-10 mm, though contemporary scientific evidence is conflicting. The strength of the evidence for MET in small (<6 mm) stones in the distal ureter is weak, though it might still have a clinical benefit in larger ureteral stones (>5 mm). At the same time, risks associated with short-course alpha-blocker therapy are very low and likely outweighed by the potential upside of hastening stone passage.   Shock Wave Lithotripsy (SWL):   This is the least invasive form of surgery for stones and an excellent option for select stones. For ureteral stones, SWL is limited to the upper ureter. I explained how the procedure is performed and the concept behind shock waves. Procedural success is dependent on several stone and environmental factors such as the stone composition or density on CT, the presence or absence of hydronephrosis, size of the stone, stone location, and skin-to-stone distance on CT scan (patients body habitus). For these reasons, not all stones/patients are good candidates for SWL. The chances of being stone free after SWL are often much lower compared to other modalities, such as ureteroscopy, except in select cases where stone-free rates are comparable. Therefore, there is a risk that the patient would need subsequent procedures to render them stone-free. Since this is a non-invasive procedure, we are relying on the kidney to spontaneously pass the resultant stone fragments. At the same time, this procedure does carry some perioperative risks, mainly bleeding and infection, as well as the small risk of developing obstruction due to passage of stone fragments, which could require urgent placement of a double-J ureteral stent or nephrostomy tube.   Ureteroscopy (URS):   I explained the technique in detail and how it is performed. Though more invasive than SWL, stone-free rates are higher with this procedure for many stones, including those in the distal ureter and larger stones in the kidney and other segments of the ureter. Unlike SWL, its success rate is far less dependent on stone hardness, an unknown in many patients until the time of surgery. With regards to surgical outcomes, true (100%) stone-free rates approach 90% for ureteral stones and are more conservative (~60%) for renal stones - importantly, these rates are directly correlated to the total stone size/burden. Very commonly, a ureteral stent is left in place at the conclusion of the procedure, but only if needed. I explained that up to 80% of patients have some degree of stent-related symptoms, including flank pain, urinary urgency, pain with urination, blood in the urine, or general discomfort. If a stent is placed, it would need to be removed either cystoscopically under local anesthesia or it may have a string left externally through the urethra for removal in the office approximately 1 week after the procedure.  Risks of ureteroscopy include, but are not limited to, infection (5%), bleeding, injury to the bladder or ureter, ureteral perforation, ureteral stricture, and other risks involved with general anesthesia. There is also the risk that the procedure needs to be staged into more than one session based on the patient's internal anatomy and the size of the stone(s). Finally, dilation of the ureter and/or ureteral stent placement prior to definitive ureteroscopy may be necessary to achieve ureteral access safely.   I explained all these options to the patient and my assessment of the risks and benefits of each procedure for their stone(s).    I have answered all the patients questions and the patient has elected to undergo a shockwave lithotripsy (SWL).     Plan: -Schedule for Right SWL 8/13 -Pre-operative labs, UA/UCx already completed -No medical clearance requested  - KUB ordered

## 2024-07-24 NOTE — HISTORY OF PRESENT ILLNESS
[FreeTextEntry1] : Name IMANI RAMIREZ MRN [id number]  Aug  7 1985 ------------------------------------------------------------------------------------------------------------------------------------------- Date of First Visit:  2024 Referring Provider/PCP: Dr. Matheus Ortiz  ------------------------------------------------------------------------------------------------------------------------------------------- CC: kidney stones   History of Present Illness: IMANI RAMIREZ is a 38 year old male with PMH anxiety who presents for evaluation of kidney stones. He presented to the [facility] ED on  with right sided flank pain. CT demonstrated 11mm right UPJ stone with hydronephrosis.  WBC 13.50, creatinine 1.17, UA with small leukocyte esterase and blood, UCx ultimately negative. He was admitted for pain control and possible stent, but was discharged without stent insertion. Has been feeling well. Denies fever, chills, nausea, vomiting, severe pain, urinary complaints. Has not seen the stone in his urine. First ever stone when younger (passed), and second stone was 8 years ago - s/p urgent stent insertion followed by elective SWL. Recalls stent sx being awful.   Imaging: CT scan from 2024 can be found in [facility] PACS. Findings:  11 mm stone at right ureteropelvic junction with mild hydronephrosis. Mild pelvicalyceal and perinephric stranding. Additional nonobstructing right upper pole stone 4 mm. No left-sided calculi or hydronephrosis.   Previous urine cultures: 2024: <10,000 CFU/mL Normal Urogenital Raissa   Kidney Stone History: First-time stone former - no Concurrent asymptomatic stone(s) - Yes, 2 mm right upper pole Previous stone surgeries - yes (urgent stent followed by elective SWL ~) Previous passed stones - yes (x1) Comorbidities - non-contributory Previous metabolic evaluation - no

## 2024-07-24 NOTE — HISTORY OF PRESENT ILLNESS
[FreeTextEntry1] : Name IMANI RAMIREZ MRN [id number]  Aug  7 1985 ------------------------------------------------------------------------------------------------------------------------------------------- Date of First Visit:  2024 Referring Provider/PCP: Dr. Matheus Ortiz  ------------------------------------------------------------------------------------------------------------------------------------------- CC: kidney stones   History of Present Illness: IMANI RAMIREZ is a 38 year old male with PMH anxiety who presents for evaluation of kidney stones. He presented to the [facility] ED on  with right sided flank pain. CT demonstrated 11mm right UPJ stone with hydronephrosis.  WBC 13.50, creatinine 1.17, UA with small leukocyte esterase and blood, UCx ultimately negative. He was admitted for pain control and possible stent, but was discharged without stent insertion. Has been feeling well. Denies fever, chills, nausea, vomiting, severe pain, urinary complaints. Has not seen the stone in his urine. First ever stone when younger (passed), and second stone was 8 years ago - s/p urgent stent insertion followed by elective SWL. Recalls stent sx being awful.   Imaging: CT scan from 2024 can be found in [facility] PACS. Findings:  11 mm stone at right ureteropelvic junction with mild hydronephrosis. Mild pelvicalyceal and perinephric stranding. Additional nonobstructing right upper pole stone 4 mm. No left-sided calculi or hydronephrosis.   Previous urine cultures: 2024: <10,000 CFU/mL Normal Urogenital Raissa   Kidney Stone History: First-time stone former - no Concurrent asymptomatic stone(s) - Yes, 2 mm right upper pole Previous stone surgeries - yes (urgent stent followed by elective SWL ~) Previous passed stones - yes (x1) Comorbidities - non-contributory Previous metabolic evaluation - no

## 2024-07-26 DIAGNOSIS — N13.0 HYDRONEPHROSIS WITH URETEROPELVIC JUNCTION OBSTRUCTION: ICD-10-CM

## 2024-07-26 DIAGNOSIS — Z53.29 PROCEDURE AND TREATMENT NOT CARRIED OUT BECAUSE OF PATIENT'S DECISION FOR OTHER REASONS: ICD-10-CM

## 2024-08-05 ENCOUNTER — NON-APPOINTMENT (OUTPATIENT)
Age: 39
End: 2024-08-05

## 2024-08-06 ENCOUNTER — OUTPATIENT (OUTPATIENT)
Dept: OUTPATIENT SERVICES | Facility: HOSPITAL | Age: 39
LOS: 1 days | End: 2024-08-06
Payer: COMMERCIAL

## 2024-08-06 PROCEDURE — 74018 RADEX ABDOMEN 1 VIEW: CPT

## 2024-08-06 PROCEDURE — 74018 RADEX ABDOMEN 1 VIEW: CPT | Mod: 26

## 2024-08-12 ENCOUNTER — TRANSCRIPTION ENCOUNTER (OUTPATIENT)
Age: 39
End: 2024-08-12

## 2024-08-12 NOTE — ASU PATIENT PROFILE, ADULT - NSICDXPASTSURGICALHX_GEN_ALL_CORE_FT
PAST SURGICAL HISTORY:  History of right inguinal hernia repair     History of stent insertion of renal artery      PAST SURGICAL HISTORY:  History of removal of ureteral stent 2016    History of right inguinal hernia repair

## 2024-08-12 NOTE — ASU PATIENT PROFILE, ADULT - URINARY CATHETER
Preferred inhaler    Advair HFA   Arnuity Ellipta  Amandaztri Aerosphere  Budesonide-formoterol Fumarate  Dulera       To pcp for review    no

## 2024-08-12 NOTE — ASU PATIENT PROFILE, ADULT - NS PRO ABUSE SCREEN AFRAID ANYONE YN
Percutaneous Coronary Intervention: What to Expect at [facility]  Percutaneous coronary intervention (PCI) is the name for procedures that are used to open a blocked coronary artery. The two most common PCI procedures are coronary angioplasty and coronary stent placement. Your groin or arm may have a bruise and feel sore for a day or two after a percutaneous coronary intervention (PCI). You can do light activities around the house, but nothing strenuous for several days. This care sheet gives you a general idea about how long it will take for you to recover. But each person recovers at a different pace. Follow the steps below to get better as quickly as possible. How can you care for yourself at home? Activity  · Do not do strenuous exercise and do not lift anything heavy until your doctor says it is okay. You can walk around the house and do light activity, such as cooking. · For 7-10 days after you go home, do not take baths. Showers are okay. · Try not to walk up stairs for the first couple of days (if the catheter was placed in the artery in your groin). · Go back to regular exercise after seen by cardiologist. Exercise is good for your heart. Get at least 30 minutes of physical activity on most days of the week. Walking is a good choice. If your doctor says it is okay, you also may want to do other activities, such as running, swimming, cycling, or playing tennis. Diet  · Drink plenty of fluids to help your body flush out the dye. If you have kidney, heart, or liver disease and have to limit fluids, talk with your doctor before you increase the amount of fluids you drink. · Keep eating a heart-healthy, low-fat diet that has lots of fruits, vegetables, and whole grains. If you have not been eating this way, talk to your doctor. You also may want to talk to a dietitian. This expert can help you to learn about healthy foods and plan meals.   Medicines  · Your doctor may have prescribed a blood-thinning medicine like aspirin and/or Plavix. It is very important that you take these medicines daily exactly as directed in order to keep the coronary artery open and reduce your risk of a heart attack. · Call your doctor if you think you are having a problem with your medicine. Care of the catheter site  · For 1 or 2 days, you may keep a bandage over the spot where the catheter was inserted if needed. Most often, the bandage may be removed at discharge. · Put ice or a cold pack on the area for 10 to 15 minutes at a time to help with soreness or swelling. Put a thin cloth between the ice and your skin. Follow-up care is a key part of your treatment and safety. Be sure to make and go to all appointments, and call your doctor if you are having problems. Its also a good idea to know your test results. Keep an updated list of your medicines to show all medical providers. When should you call for help? Call 911 anytime you think you may need emergency care. For example, call if:  · You pass out (lose consciousness). · You have severe trouble breathing. · You have sudden chest pain and shortness of breath, or you cough up blood. · You have chest pain or pressure. This may occur with:  ¨ Sweating. ¨ Shortness of breath. ¨ Nausea or vomiting. ¨ Pain that spreads from the chest to the neck, jaw, or one or both shoulders or arms. ¨ Dizziness or lightheadedness. ¨ A fast or uneven pulse. After calling 911, chew 1 adult aspirin. Wait for an ambulance. Do not try to drive yourself. · You have been diagnosed with angina, and you have chest pain that does not go away with rest or is not getting better within 5 minutes after you take a dose of nitroglycerin. Call your doctor now or seek immediate medical care if:  · You are bleeding from the area where the catheter was put in your artery. · You have signs of infection, such as:  ¨ Increased pain, swelling, warmth, or redness.   ¨ Red streaks leading from the catheter site. ¨ Pus draining from the catheter site. ¨ Swollen lymph nodes in your neck, armpits, or groin. ¨ A fever. · Your leg or arm looks blue or feels cold, numb, or tingly. Watch closely for changes in your health, and be sure to contact your doctor if you have any problems. Where can you learn more? Go to [employer]  Enter G227 in the search box to learn more about \"Percutaneous Coronary Intervention: What to Expect at Home\". This care instruction is for use with your licensed healthcare professional. If you have questions about a medical condition or this instruction, always ask your healthcare professional. Care instructions adapted by [employer] (which disclaims liability or warranty for this information) from Winifred Lira, [address] [facility] 2008. [facility] disclaims any warranty or liability for your use of this information. no

## 2024-08-12 NOTE — ASU PATIENT PROFILE, ADULT - AS SC BRADEN ACTIVITY
Last visit: 09/29/2020  Last Med refill: 01/15/2021  Does patient have enough medication for 72 hours: no    Next Visit Date:  Future Appointments   Date Time Provider Coretta Orozco   3/29/2021  1:15 PM Nicole Champion  [address] Maintenance   Topic Date Due    DTaP/Tdap/Td vaccine (1 - Tdap) 10/23/1981    Shingles Vaccine (1 of 2) 10/23/2012    Colon cancer screen colonoscopy  10/23/2012    Flu vaccine (1) 09/29/2021 (Originally 9/1/2020)    Pneumococcal 0-64 years Vaccine (1 of 1 - PPSV23) 09/29/2021 (Originally 10/23/1968)    Lipid screen  01/04/2022    TSH testing  01/04/2022    Cervical cancer screen  07/13/2022    Breast cancer screen  11/10/2022    Hepatitis C screen  Completed    HIV screen  Completed    Hepatitis A vaccine  Aged Out    Hepatitis B vaccine  Aged Out    Hib vaccine  Aged Out    Meningococcal (ACWY) vaccine  Aged Out       Hemoglobin A1C (%)   Date Value   01/04/2021 5.6   12/04/2019 5.6   12/10/2012 5.6             ( goal A1C is < 7)   No results found for: LABMICR  LDL Cholesterol (mg/dL)   Date Value   01/04/2021 102   12/04/2019 134 (H)       (goal LDL is <100)   AST (U/L)   Date Value   01/04/2021 25     ALT (U/L)   Date Value   01/04/2021 17     BUN (mg/dL)   Date Value   01/04/2021 10     BP Readings from Last 3 Encounters:   09/29/20 118/78   06/26/20 106/69   12/04/19 120/83          (goal 120/80)    All Future Testing planned in [facility]  Lab Frequency Next Occurrence   Cologuard (For External Results Only) Once 02/08/2021   Hemoglobin A1C Once 02/13/2021               Patient Active Problem List:     Hypothyroidism     Hypercholesteremia     Allergic conjunctivitis of both eyes     Generalized pain     Other chest pain     Gastritis     Tobacco abuse (4) walks frequently

## 2024-08-12 NOTE — ASU PATIENT PROFILE, ADULT - NSICDXPASTMEDICALHX_GEN_ALL_CORE_FT
PAST MEDICAL HISTORY:  ADHD (attention deficit hyperactivity disorder)     Anxiety     Hiatal hernia     History of gastritis     Kidney stones     No pertinent past medical history

## 2024-08-13 ENCOUNTER — APPOINTMENT (OUTPATIENT)
Dept: UROLOGY | Facility: AMBULATORY SURGERY CENTER | Age: 39
End: 2024-08-13

## 2024-08-13 ENCOUNTER — OUTPATIENT (OUTPATIENT)
Dept: OUTPATIENT SERVICES | Facility: HOSPITAL | Age: 39
LOS: 1 days | Discharge: HOME | End: 2024-08-13
Payer: COMMERCIAL

## 2024-08-13 VITALS
HEIGHT: 73 IN | WEIGHT: 166.45 LBS | TEMPERATURE: 98 F | DIASTOLIC BLOOD PRESSURE: 66 MMHG | SYSTOLIC BLOOD PRESSURE: 118 MMHG | OXYGEN SATURATION: 100 % | HEART RATE: 62 BPM | RESPIRATION RATE: 18 BRPM

## 2024-08-13 VITALS
HEART RATE: 71 BPM | SYSTOLIC BLOOD PRESSURE: 102 MMHG | RESPIRATION RATE: 16 BRPM | DIASTOLIC BLOOD PRESSURE: 49 MMHG | TEMPERATURE: 97 F

## 2024-08-13 DIAGNOSIS — Z98.890 OTHER SPECIFIED POSTPROCEDURAL STATES: Chronic | ICD-10-CM

## 2024-08-13 PROCEDURE — 50590 FRAGMENTING OF KIDNEY STONE: CPT | Mod: RT

## 2024-08-13 RX ORDER — FENTANYL CITRATE 1200 UG/1
25 LOZENGE ORAL; TRANSMUCOSAL
Refills: 0 | Status: DISCONTINUED | OUTPATIENT
Start: 2024-08-13 | End: 2024-08-13

## 2024-08-13 RX ORDER — EMTRICITABINE AND TENOFOVIR DISOPROXIL FUMARATE 200; 300 MG/1; MG/1
1 TABLET, FILM COATED ORAL
Refills: 0 | DISCHARGE

## 2024-08-13 RX ORDER — DEXTROSE MONOHYDRATE, SODIUM CHLORIDE, SODIUM LACTATE, CALCIUM CHLORIDE, MAGNESIUM CHLORIDE 1.5; 538; 448; 18.4; 5.08 G/100ML; MG/100ML; MG/100ML; MG/100ML; MG/100ML
1000 SOLUTION INTRAPERITONEAL
Refills: 0 | Status: DISCONTINUED | OUTPATIENT
Start: 2024-08-13 | End: 2024-08-13

## 2024-08-13 RX ORDER — OMEPRAZOLE 100 %
1 POWDER (GRAM) MISCELLANEOUS
Refills: 0 | DISCHARGE

## 2024-08-13 NOTE — BRIEF OPERATIVE NOTE - OPERATION/FINDINGS
stone visualized well on flouro, targeted and hit with 2500 shocks. Total power is 5 with a ramp up period of 50 shocks each at the level. Stone fragments visualized at the end of the case, no hematoma or ecchymosis of the skin noted at the end of the case.

## 2024-08-13 NOTE — ASU DISCHARGE PLAN (ADULT/PEDIATRIC) - CARE PROVIDER_API CALL
Alex Carbajal.  Urology  [address]  [place] [address]  Phone: [phone]  Fax: [phone]  Follow Up Time: 2 weeks

## 2024-08-13 NOTE — ASU DISCHARGE PLAN (ADULT/PEDIATRIC) - ASU DC SPECIAL INSTRUCTIONSFT
EXTRACORPOREAL SHOCKWAVE LITHOTRIPSY    GENERAL: It is common to have blood in your urine after your procedure.  It may be pink or even red; and it is important to increase fluid intake to 2-3L of water per day to keep the urine as clear as possible. Please inform your doctor if you have a significant amount of clot in the urine or if you are unable to void at all.  The urine may clear and then become bloody again especially as you are more physically active. It is not uncommon to have some burning when you urinate, this will gradually improve. If a catheter in place, it is not uncommon to have occasional leakage or urine or blood around the catheter. Please call your urologist if this is excessive and/or the urine is not draining through the catheter into the bag.    CATHETER: Some patients are sent home with a Palacios catheter, which continuously drains the urine from the bladder. If you still have a catheter, the nurses will review instructions and care before you go home. For men, you may have a prescription for lidocaine jelly to apply to the tip of your penis, as needed, for catheter related discomfort.    UROLOGIC MEDICATIONS:  The following medications may have been sent to your pharmacy for stent related discomfort: Flomax (tamsulosin) 0.4mg at bedtime until stent removed, Ditropan (oxybutynin) 5mg every 8 hours as needed for bladder spasms, and Pyridium (phenazopyridine) 100mg every 8 hours as needed for kidney/bladder discomfort for max 3 days (Pyridium will make your urine orange).     PAIN: You may take Tylenol (acetaminophen) 650-975mg and/or Motrin (ibuprofen) 400-600mg, both available over the counter, for pain every 6 hours as needed. Do not exceed 4000mg of Tylenol (acetaminophen) daily. You may alternate these medications such that you take one or the other every 3 hours for around the clock pain coverage.    ANTIBIOTICS: You may be given a prescription for an antibiotic, please take this medication as instructed and be sure to complete the entire course.    STOOL SOFTENERS: Do not allow yourself to become constipated as straining may cause bleeding. Take stool softeners or a laxative (ex. Miralax, Colace, Senokot, ExLax, etc), available over the counter, if needed.    ANTICOAGULATION: If you are taking any blood thinning medications, please discuss with your urologist prior to restarting these medications unless otherwise specified.    BATHING: You may shower or bathe. If going home with palacios, shower only until catheter is removed.    DIET: You may resume your regular diet and regular medication regimen.    ACTIVITY: No heavy lifting or strenuous exercise until you are evaluated at your post-operative appointment. Otherwise, you may return to your usual level of physical activity.    FOLLOW-UP: If you did not already schedule your post-operative appointment, please call your urologist to schedule and follow-up appointment.    CALL YOUR UROLOGIST IF: You have any bleeding that does not stop, inability to void >8 hours, fever over 100.4 F, chills, persistent nausea/vomiting, changes in your incision concerning for infection, or if your pain is not controlled on your discharge pain medications.

## 2024-08-13 NOTE — PRE-OP CHECKLIST - AICD PRESENT
1. Have you been to the ER, urgent care clinic since your last visit? Hospitalized since your last visit? Yes When: Pt first for sinus     2. Have you seen or consulted any other health care providers outside of the [address] [place] [address] since your last visit? Include any pap smears or colon screening.  No  Reviewed record in preparation for visit and have necessary documentation  Pt did not bring medication to office visit for review  Information was given to pt on Advanced Directives, Living Will  Information was given on Shingles Vaccine  opportunity was given for questions  Goals that were addressed and/or need to be completed during or after this appointment include          Health Maintenance Due   Topic Date Due    Shingrix Vaccine Age 50> (1 of 2) 08/25/1993    GLAUCOMA SCREENING Q2Y  07/06/2018    MEDICARE YEARLY EXAM  02/06/2019 no

## 2024-08-15 ENCOUNTER — NON-APPOINTMENT (OUTPATIENT)
Age: 39
End: 2024-08-15

## 2024-08-19 PROBLEM — N20.0 CALCULUS OF KIDNEY: Chronic | Status: ACTIVE | Noted: 2024-08-12

## 2024-08-19 PROBLEM — F41.9 ANXIETY DISORDER, UNSPECIFIED: Chronic | Status: ACTIVE | Noted: 2024-08-12

## 2024-08-19 PROBLEM — Z87.19 PERSONAL HISTORY OF OTHER DISEASES OF THE DIGESTIVE SYSTEM: Chronic | Status: ACTIVE | Noted: 2024-08-12

## 2024-08-19 PROBLEM — F90.9 ATTENTION-DEFICIT HYPERACTIVITY DISORDER, UNSPECIFIED TYPE: Chronic | Status: ACTIVE | Noted: 2024-08-12

## 2024-08-19 PROBLEM — K44.9 DIAPHRAGMATIC HERNIA WITHOUT OBSTRUCTION OR GANGRENE: Chronic | Status: ACTIVE | Noted: 2024-08-12

## 2024-09-11 ENCOUNTER — APPOINTMENT (OUTPATIENT)
Dept: UROLOGY | Facility: CLINIC | Age: 39
End: 2024-09-11
Payer: COMMERCIAL

## 2024-09-11 PROCEDURE — 99024 POSTOP FOLLOW-UP VISIT: CPT

## 2024-09-11 PROCEDURE — 76775 US EXAM ABDO BACK WALL LIM: CPT

## 2024-09-12 NOTE — HISTORY OF PRESENT ILLNESS
01/24/19  2:19 PM    Called and spoke with patient in regards to echocardiogram results.  Patient states she is feeling well.      FAM Mcnulty  [facility]     [FreeTextEntry1] : Name IMANI RAMIREZ MRN [id number]  Aug 7 1985 ------------------------------------------------------------------------------------------------------------------------------------------- Date of First Visit: 2024 Referring Provider/PCP: Dr. Matheus Ortiz ------------------------------------------------------------------------------------------------------------------------------------------- CC: kidney stones  History of Present Illness: IMANI RAMIREZ is a 38 year old male with PMH anxiety who presents for evaluation of kidney stones. He presented to the [facility] ED on  with right sided flank pain. CT demonstrated 11mm right UPJ stone with hydronephrosis. WBC 13.50, creatinine 1.17, UA with small leukocyte esterase and blood, UCx ultimately negative. He was admitted for pain control and possible stent, but was discharged without stent insertion. Has been feeling well. Denies fever, chills, nausea, vomiting, severe pain, urinary complaints. Has not seen the stone in his urine. First ever stone when younger (passed), and second stone was 8 years ago - s/p urgent stent insertion followed by elective SWL. Recalls stent sx being awful.  Imaging: CT scan from 2024 can be found in [facility] PACS. Findings: 11 mm stone at right ureteropelvic junction with mild hydronephrosis. Mild pelvicalyceal and perinephric stranding. Additional nonobstructing right upper pole stone 4 mm. No left-sided calculi or hydronephrosis.  Previous urine cultures: 2024: <10,000 CFU/mL Normal Urogenital Raissa  Kidney Stone History: First-time stone former - no Concurrent asymptomatic stone(s) - Yes, 2 mm right upper pole Previous stone surgeries - yes (urgent stent followed by elective SWL ~) Previous passed stones - yes (x1) Comorbidities - non-contributory Previous metabolic evaluation - no  ------------------------------------------------------------------------------------------------------------------------------------------- Interval History (2024): Patient presents for 1-month follow up and renal ultrasound after right SWL on . Doing well, no complaints. Ultrasound performed in the office today demonstrates 1 cm echogenic focus in the RLP. Pt states he saw a bunch of debris in his urine in the 2 weeks after treatment. Therefore, suspect some residual stone debris settled in the lower pole, less likely stone partially refractory to SWL and instead relocated. Yet to complete 24hu but interested in doing so.

## 2024-09-12 NOTE — HISTORY OF PRESENT ILLNESS
[FreeTextEntry1] : Name IMANI RAMIREZ MRN [id number]  Aug 7 1985 ------------------------------------------------------------------------------------------------------------------------------------------- Date of First Visit: 2024 Referring Provider/PCP: Dr. Matheus Ortiz ------------------------------------------------------------------------------------------------------------------------------------------- CC: kidney stones  History of Present Illness: IMANI RAMIREZ is a 38 year old male with PMH anxiety who presents for evaluation of kidney stones. He presented to the [facility] ED on  with right sided flank pain. CT demonstrated 11mm right UPJ stone with hydronephrosis. WBC 13.50, creatinine 1.17, UA with small leukocyte esterase and blood, UCx ultimately negative. He was admitted for pain control and possible stent, but was discharged without stent insertion. Has been feeling well. Denies fever, chills, nausea, vomiting, severe pain, urinary complaints. Has not seen the stone in his urine. First ever stone when younger (passed), and second stone was 8 years ago - s/p urgent stent insertion followed by elective SWL. Recalls stent sx being awful.  Imaging: CT scan from 2024 can be found in [facility] PACS. Findings: 11 mm stone at right ureteropelvic junction with mild hydronephrosis. Mild pelvicalyceal and perinephric stranding. Additional nonobstructing right upper pole stone 4 mm. No left-sided calculi or hydronephrosis.  Previous urine cultures: 2024: <10,000 CFU/mL Normal Urogenital Raissa  Kidney Stone History: First-time stone former - no Concurrent asymptomatic stone(s) - Yes, 2 mm right upper pole Previous stone surgeries - yes (urgent stent followed by elective SWL ~) Previous passed stones - yes (x1) Comorbidities - non-contributory Previous metabolic evaluation - no  ------------------------------------------------------------------------------------------------------------------------------------------- Interval History (2024): Patient presents for 1-month follow up and renal ultrasound after right SWL on . Doing well, no complaints. Ultrasound performed in the office today demonstrates 1 cm echogenic focus in the RLP. Pt states he saw a bunch of debris in his urine in the 2 weeks after treatment. Therefore, suspect some residual stone debris settled in the lower pole, less likely stone partially refractory to SWL and instead relocated. Yet to complete 24hu but interested in doing so.

## 2024-09-12 NOTE — ASSESSMENT
[FreeTextEntry1] : Assessment: IMANI RAMIREZ is a 39 year old M with nephrolithiasis s/p right SWL with likely residual stone debris/fragments in the RLP. No longer with stone(s) at the right UPJ. He is asymptomatic and feels well. Not interested in further treatments at this time and prefers surveillance.      Plan: -Follow up visit in 6 months with renal ultrasound -Instructed on potential value of percussion, diuresis, and inversion -Continue with generalized stone prevention strategies as previously discussed (increase fluid intake to keep urine clear or very faint yellow, limit dietary salt intake, increase fruits and vegetables, limit high oxalate foods, maintain normal dietary calcium, and moderation of non-dairy animal protein) -Follow up 24-hour urinalysis results. Will contact the patient with results once available. At that point we will determine any medical management as well as plans for other follow-up visits.

## (undated) DEVICE — WARMING BLANKET UPPER ADULT

## (undated) DEVICE — VENODYNE/SCD SLEEVE CALF MEDIUM

## (undated) DEVICE — POSITIONER FOAM EGG CRATE ULNAR 2PCS (PINK)